# Patient Record
Sex: FEMALE | Race: WHITE | NOT HISPANIC OR LATINO | Employment: FULL TIME | URBAN - METROPOLITAN AREA
[De-identification: names, ages, dates, MRNs, and addresses within clinical notes are randomized per-mention and may not be internally consistent; named-entity substitution may affect disease eponyms.]

---

## 2023-04-13 ENCOUNTER — TELEMEDICINE (OUTPATIENT)
Dept: PHARMACY | Facility: HOSPITAL | Age: 53
End: 2023-04-13
Payer: COMMERCIAL

## 2023-04-13 DIAGNOSIS — E11.9 TYPE 2 DIABETES MELLITUS WITHOUT COMPLICATION, WITHOUT LONG-TERM CURRENT USE OF INSULIN (MULTI): Primary | ICD-10-CM

## 2023-04-13 RX ORDER — DULAGLUTIDE 0.75 MG/.5ML
0.75 INJECTION, SOLUTION SUBCUTANEOUS
COMMUNITY
Start: 2023-04-12 | End: 2023-05-11 | Stop reason: ALTCHOICE

## 2023-04-13 RX ORDER — FLUTICASONE PROPIONATE 50 MCG
1 SPRAY, SUSPENSION (ML) NASAL DAILY PRN
COMMUNITY
Start: 2022-03-31

## 2023-04-13 RX ORDER — MINERAL OIL
1 ENEMA (ML) RECTAL DAILY
COMMUNITY
Start: 2022-03-31 | End: 2024-03-13 | Stop reason: ALTCHOICE

## 2023-04-13 RX ORDER — ERGOCALCIFEROL 1.25 1/1
50000 CAPSULE ORAL
COMMUNITY
Start: 2023-04-12 | End: 2024-03-13 | Stop reason: ALTCHOICE

## 2023-04-13 RX ORDER — PRAVASTATIN SODIUM 20 MG/1
20 TABLET ORAL DAILY
COMMUNITY
Start: 2023-01-24 | End: 2023-11-20 | Stop reason: ALTCHOICE

## 2023-04-13 RX ORDER — EMPAGLIFLOZIN 10 MG/1
1 TABLET, FILM COATED ORAL DAILY
COMMUNITY
Start: 2020-07-30 | End: 2024-03-13 | Stop reason: SDUPTHER

## 2023-04-13 NOTE — PROGRESS NOTES
Ivelisse Pickens is a 52 y.o. female was referred to Clinical Pharmacy Team to complete a comprehensive medication review (CMR) with a pharmacist as part of the [employer] diabetes program.  The patient was referred for their Diabetes (VBID renewal).    Referring Provider: Chidi Blood DO    Subjective   No Known Allergies    [facility] - [place], [place] - [address]  [address] [address]  Phone: [phone] Fax: [phone]    Diabetes  She presents for her follow-up diabetic visit. She has type 2 diabetes mellitus. Her disease course has been stable. Current diabetic treatment includes oral agent (dual therapy) (trulicity and jardiance).     Objective     There were no vitals taken for this visit.     LAB  Lab Results   Component Value Date    BILITOT 0.5 01/21/2023    CALCIUM 9.3 01/21/2023    CO2 27 01/21/2023     01/21/2023    CREATININE 0.61 01/21/2023    GLUCOSE 111 (H) 01/21/2023    ALKPHOS 83 01/21/2023    K 4.2 01/21/2023    PROT 7.6 01/21/2023     01/21/2023    AST 12 01/21/2023    ALT 12 01/21/2023    BUN 11 01/21/2023    ANIONGAP 12 01/21/2023    MG 2.02 04/01/2022    ALBUMIN 4.6 01/21/2023    GFRF >90 01/21/2023     Lab Results   Component Value Date    TRIG 107 01/21/2023    CHOL 220 (H) 01/21/2023    HDL 45.9 01/21/2023     Lab Results   Component Value Date    HGBA1C 6.6 (A) 01/21/2023     Current Outpatient Medications on File Prior to Visit   Medication Sig Dispense Refill    fexofenadine (Allegra) 180 mg tablet Take 1 tablet (180 mg) by mouth once daily.      fluticasone (Flonase) 50 mcg/actuation nasal spray Administer 1 spray into each nostril once daily.      Jardiance 10 mg Take 1 tablet (10 mg) by mouth once daily.      pravastatin (Pravachol) 20 mg tablet Take 1 tablet (20 mg) by mouth once daily.      Trulicity 0.75 mg/0.5 mL pen injector Inject 0.75 mg under the skin 1 (one) time per week.      Vitamin D2 1,250 mcg  (50,000 unit) capsule Take 1 capsule (50,000 Units) by mouth 1 (one) time per week.       No current facility-administered medications on file prior to visit.      DRUG INTERACTIONS  - No significant drug interactions that require medication adjustment at this time.     SECONDARY PREVENTION  - Statin? yes  - ACE-I/ARB? no    PATIENT EDUCATION/GOALS    1. Discussed disease specific goals:   - Fasting B - 130 mg/dL  - Postprandial BG: less than 180 mg/dL  - A1c: less than 7%    Assessment/Plan   Problem List Items Addressed This Visit          Endocrine/Metabolic    Type 2 diabetes mellitus without complication, without long-term current use of insulin (CMS/[facility]) - Primary      Patient was referred to Clinical Pharmacy Team to complete a comprehensive medication review (CMR) with a pharmacist as part of the [employer] diabetes program. Because patient's A1c is at goal evidenced by most recent A1c of 6.6% on 23, will defer from making medication changes at this time. Please continue sending medications to  pharmacy.     2.   Medications are appropriately dosed for renal and hepatic function.     3.  Discussed pharmacy benefit plan:  - Explained that in order to continue receiving diabetic medications with added cost savings ($0 copays), patient must have an annual consultation with a clinical pharmacist to review medications, address any concerns, and manage diabetic medications when appropriate     Follow up: 11 months, 3/13/24    Josette Tello PharmD     Verbal consent to manage patient's drug therapy was obtained from [the patient and/or an individual authorized to act on behalf of a patient]. They were informed they may decline to participate or withdraw from participation in pharmacy services at any time.

## 2023-04-18 NOTE — PROGRESS NOTES
I reviewed the progress note and agree with the resident’s findings and plans as written. Case discussed with resident.    Emory Ortega, PharmD

## 2023-04-19 ENCOUNTER — HOSPITAL ENCOUNTER (OUTPATIENT)
Dept: DATA CONVERSION | Facility: HOSPITAL | Age: 53
End: 2023-04-19
Attending: RADIOLOGY
Payer: COMMERCIAL

## 2023-04-19 DIAGNOSIS — R92.8 OTHER ABNORMAL AND INCONCLUSIVE FINDINGS ON DIAGNOSTIC IMAGING OF BREAST: ICD-10-CM

## 2023-04-19 DIAGNOSIS — D05.12 INTRADUCTAL CARCINOMA IN SITU OF LEFT BREAST: ICD-10-CM

## 2023-04-19 DIAGNOSIS — R92.1 MAMMOGRAPHIC CALCIFICATION FOUND ON DIAGNOSTIC IMAGING OF BREAST: ICD-10-CM

## 2023-04-25 LAB
ALANINE AMINOTRANSFERASE (SGPT) (U/L) IN SER/PLAS: 13 U/L (ref 7–45)
ALBUMIN (G/DL) IN SER/PLAS: 4.7 G/DL (ref 3.4–5)
ALKALINE PHOSPHATASE (U/L) IN SER/PLAS: 75 U/L (ref 33–110)
ANION GAP IN SER/PLAS: 13 MMOL/L (ref 10–20)
ASPARTATE AMINOTRANSFERASE (SGOT) (U/L) IN SER/PLAS: 14 U/L (ref 9–39)
BILIRUBIN TOTAL (MG/DL) IN SER/PLAS: 0.5 MG/DL (ref 0–1.2)
CALCIUM (MG/DL) IN SER/PLAS: 9.4 MG/DL (ref 8.6–10.3)
CARBON DIOXIDE, TOTAL (MMOL/L) IN SER/PLAS: 27 MMOL/L (ref 21–32)
CHLORIDE (MMOL/L) IN SER/PLAS: 103 MMOL/L (ref 98–107)
CHOLESTEROL (MG/DL) IN SER/PLAS: 189 MG/DL (ref 0–199)
CHOLESTEROL IN HDL (MG/DL) IN SER/PLAS: 43.8 MG/DL
CHOLESTEROL/HDL RATIO: 4.3
COBALAMIN (VITAMIN B12) (PG/ML) IN SER/PLAS: 899 PG/ML (ref 211–911)
CREATININE (MG/DL) IN SER/PLAS: 0.65 MG/DL (ref 0.5–1.05)
ESTIMATED AVERAGE GLUCOSE FOR HBA1C: 126 MG/DL
GFR FEMALE: >90 ML/MIN/1.73M2
GLUCOSE (MG/DL) IN SER/PLAS: 105 MG/DL (ref 74–99)
HEMOGLOBIN A1C/HEMOGLOBIN TOTAL IN BLOOD: 6 %
LDL: 128 MG/DL (ref 0–99)
POTASSIUM (MMOL/L) IN SER/PLAS: 4.6 MMOL/L (ref 3.5–5.3)
PROTEIN TOTAL: 7.3 G/DL (ref 6.4–8.2)
SODIUM (MMOL/L) IN SER/PLAS: 138 MMOL/L (ref 136–145)
TRIGLYCERIDE (MG/DL) IN SER/PLAS: 87 MG/DL (ref 0–149)
UREA NITROGEN (MG/DL) IN SER/PLAS: 16 MG/DL (ref 6–23)
VLDL: 17 MG/DL (ref 0–40)

## 2023-04-27 LAB
COMPLETE PATHOLOGY REPORT: NORMAL
CONVERTED ADDENDUM DIAGNOSIS 2: NORMAL
CONVERTED CLINICAL DIAGNOSIS-HISTORY: NORMAL
CONVERTED FINAL DIAGNOSIS: NORMAL
CONVERTED FINAL REPORT PDF LINK TO COPY AND PASTE: NORMAL
CONVERTED GROSS DESCRIPTION: NORMAL

## 2023-05-01 ENCOUNTER — APPOINTMENT (OUTPATIENT)
Dept: PRIMARY CARE | Facility: CLINIC | Age: 53
End: 2023-05-01
Payer: COMMERCIAL

## 2023-05-11 ENCOUNTER — OFFICE VISIT (OUTPATIENT)
Dept: PRIMARY CARE | Facility: CLINIC | Age: 53
End: 2023-05-11
Payer: COMMERCIAL

## 2023-05-11 VITALS
DIASTOLIC BLOOD PRESSURE: 74 MMHG | HEIGHT: 69 IN | SYSTOLIC BLOOD PRESSURE: 108 MMHG | BODY MASS INDEX: 34.29 KG/M2 | TEMPERATURE: 97.7 F | OXYGEN SATURATION: 98 % | WEIGHT: 231.48 LBS | HEART RATE: 90 BPM

## 2023-05-11 DIAGNOSIS — E11.9 TYPE 2 DIABETES MELLITUS WITHOUT COMPLICATION, WITHOUT LONG-TERM CURRENT USE OF INSULIN (MULTI): Primary | ICD-10-CM

## 2023-05-11 PROBLEM — E55.9 VITAMIN D DEFICIENCY: Status: ACTIVE | Noted: 2023-05-11

## 2023-05-11 PROBLEM — K21.00 GASTRO-ESOPHAGEAL REFLUX DISEASE WITH ESOPHAGITIS: Status: ACTIVE | Noted: 2023-05-11

## 2023-05-11 PROBLEM — F41.9 ANXIETY: Status: ACTIVE | Noted: 2023-05-11

## 2023-05-11 PROBLEM — E78.5 BORDERLINE HYPERLIPIDEMIA: Status: ACTIVE | Noted: 2023-05-11

## 2023-05-11 PROBLEM — E53.8 VITAMIN B 12 DEFICIENCY: Status: ACTIVE | Noted: 2023-05-11

## 2023-05-11 PROBLEM — J30.9 ALLERGIC RHINITIS: Status: ACTIVE | Noted: 2023-05-11

## 2023-05-11 PROCEDURE — 3074F SYST BP LT 130 MM HG: CPT | Performed by: FAMILY MEDICINE

## 2023-05-11 PROCEDURE — 3078F DIAST BP <80 MM HG: CPT | Performed by: FAMILY MEDICINE

## 2023-05-11 PROCEDURE — 99214 OFFICE O/P EST MOD 30 MIN: CPT | Performed by: FAMILY MEDICINE

## 2023-05-11 PROCEDURE — 3044F HG A1C LEVEL LT 7.0%: CPT | Performed by: FAMILY MEDICINE

## 2023-05-11 RX ORDER — OMEPRAZOLE 20 MG/1
20 CAPSULE, DELAYED RELEASE ORAL
COMMUNITY

## 2023-05-11 RX ORDER — VIT C/E/ZN/COPPR/LUTEIN/ZEAXAN 250MG-90MG
25 CAPSULE ORAL DAILY
COMMUNITY

## 2023-05-11 RX ORDER — ASPIRIN 81 MG/1
81 TABLET ORAL DAILY
COMMUNITY

## 2023-05-11 RX ORDER — DULAGLUTIDE 1.5 MG/.5ML
1.5 INJECTION, SOLUTION SUBCUTANEOUS
Qty: 12 EACH | Refills: 3 | Status: SHIPPED | OUTPATIENT
Start: 2023-05-11 | End: 2023-05-18 | Stop reason: SDUPTHER

## 2023-05-11 ASSESSMENT — PATIENT HEALTH QUESTIONNAIRE - PHQ9
1. LITTLE INTEREST OR PLEASURE IN DOING THINGS: NOT AT ALL
SUM OF ALL RESPONSES TO PHQ9 QUESTIONS 1 AND 2: 0
2. FEELING DOWN, DEPRESSED OR HOPELESS: NOT AT ALL

## 2023-05-11 NOTE — PROGRESS NOTES
Subjective   Patient ID: Ivelisse Pickens is a 52 y.o. female who presents for Follow-up (Follow up. ).  HPI  Patient is following up on her diabetes.  Hemoglobin A1c was 6.0.  Glucose was 105 fasting.  We also reviewed her cholesterol which showed total cholesterol 189, HDL 43.8, , triglycerides 87.  Did not continue to use Trulicity and I have increased the dose which seems to have helped out.  She is also losing weight.  Denies any problems with any cut scrapes or sores that are healing, burning numbness tingling in the hands or feet, changes in vision, chest pain, shortness of breath, lightheadedness, dizziness or passing out.  Review of Systems    Objective   Physical Exam  General: Patient is alert and oriented ×3 and appears in no acute distress. No respiratory distress.    Head: Atraumatic normocephalic.    Eyes: EOMI, PERRLA      Ears: Canals patent without any irritation, tympanic membranes without inflammation, no swelling, normal light reflex.    Nose: Nares patent. Turbinates are not swollen. No discharge.    Mouth: Normal mucosa. Moist. No erythema, exudates, tonsillar enlargement.    Neck: Normal range of motion, no masses.  Thyroid is palpable and normal in size without any nodules. No anterior cervical or posterior cervical adenopathy.    Heart: Regular rate and rhythm, no murmurs clicks or gallops    Lungs: Clear to auscultation bilaterally without any rhonchi rales or wheezing, lung sounds heard throughout all lung fields    Abdomen: Soft, nontender, no rigidity, rebound, guarding or organomegaly. Bowel sounds ×4 quadrants.    Musculoskeletal: Normal range of motion, strength is grossly intact in the proximal distal muscles of the upper and lower extremities bilaterally, deep tendon reflexes +2 out of 4 and symmetric bilaterally at the patella, Achilles, biceps, triceps, sensation intact.    Nerves: Cranial nerves II through XII appear grossly intact and without deficit    Skin: Intact,  dry, no rashes or erythema    Psych: Normal affect.  Assessment/Plan   Problem List Items Addressed This Visit       Type 2 diabetes mellitus without complication, without long-term current use of insulin (CMS/[facility]) - Primary    Relevant Orders    CBC and Auto Differential    Comprehensive Metabolic Panel    Hemoglobin A1C    Lipid Panel    Vitamin B12   Increase Trulicity  On a statin and we do not use any blood pressure medications such as lisinopril or losartan due to lower blood pressure.  Instructed to follow-up with eye doctor yearly

## 2023-05-18 ENCOUNTER — TELEPHONE (OUTPATIENT)
Dept: PRIMARY CARE | Facility: CLINIC | Age: 53
End: 2023-05-18
Payer: COMMERCIAL

## 2023-05-18 DIAGNOSIS — E11.9 TYPE 2 DIABETES MELLITUS WITHOUT COMPLICATION, WITHOUT LONG-TERM CURRENT USE OF INSULIN (MULTI): ICD-10-CM

## 2023-05-18 RX ORDER — DULAGLUTIDE 1.5 MG/.5ML
1.5 INJECTION, SOLUTION SUBCUTANEOUS
Qty: 12 EACH | Refills: 3 | Status: SHIPPED | OUTPATIENT
Start: 2023-05-18 | End: 2023-05-18

## 2023-05-18 NOTE — TELEPHONE ENCOUNTER
Can we resend script for trulicity and start a new PA since ebony has tried metformin.     Thank you!

## 2023-05-30 ENCOUNTER — TELEPHONE (OUTPATIENT)
Dept: PRIMARY CARE | Facility: CLINIC | Age: 53
End: 2023-05-30
Payer: COMMERCIAL

## 2023-05-30 DIAGNOSIS — F40.240 CLAUSTROPHOBIA: Primary | ICD-10-CM

## 2023-05-30 RX ORDER — DIAZEPAM 5 MG/1
5 TABLET ORAL ONCE
Qty: 1 TABLET | Refills: 0 | Status: SHIPPED | OUTPATIENT
Start: 2023-05-30 | End: 2023-06-30 | Stop reason: SDUPTHER

## 2023-05-30 NOTE — TELEPHONE ENCOUNTER
Marlin left a message she had to reschedule her MRI because of claustrophobia, she is scheduled again this Friday for the MRI she is wondering if you could send a prescription in for her to help her with the MRI?

## 2023-06-29 ENCOUNTER — TELEPHONE (OUTPATIENT)
Dept: PRIMARY CARE | Facility: CLINIC | Age: 53
End: 2023-06-29
Payer: COMMERCIAL

## 2023-06-29 DIAGNOSIS — F40.240 CLAUSTROPHOBIA: ICD-10-CM

## 2023-06-29 NOTE — TELEPHONE ENCOUNTER
Pt left message that she has to have one more mri guided breast biopsy asking if you could send one more valium for this procedure.

## 2023-06-30 RX ORDER — DIAZEPAM 5 MG/1
5 TABLET ORAL ONCE
Qty: 2 TABLET | Refills: 0 | Status: SHIPPED | OUTPATIENT
Start: 2023-06-30 | End: 2024-03-13 | Stop reason: ALTCHOICE

## 2023-07-19 ENCOUNTER — HOSPITAL ENCOUNTER (OUTPATIENT)
Dept: DATA CONVERSION | Facility: HOSPITAL | Age: 53
End: 2023-07-19
Attending: SURGERY
Payer: COMMERCIAL

## 2023-07-19 DIAGNOSIS — R92.8 OTHER ABNORMAL AND INCONCLUSIVE FINDINGS ON DIAGNOSTIC IMAGING OF BREAST: ICD-10-CM

## 2023-07-25 LAB
COMPLETE PATHOLOGY REPORT: NORMAL
CONVERTED CLINICAL DIAGNOSIS-HISTORY: NORMAL
CONVERTED FINAL DIAGNOSIS: NORMAL
CONVERTED FINAL REPORT PDF LINK TO COPY AND PASTE: NORMAL
CONVERTED GROSS DESCRIPTION: NORMAL

## 2023-08-21 ENCOUNTER — HOSPITAL ENCOUNTER (OUTPATIENT)
Dept: DATA CONVERSION | Facility: HOSPITAL | Age: 53
End: 2023-08-21
Payer: COMMERCIAL

## 2023-08-21 DIAGNOSIS — R92.8 OTHER ABNORMAL AND INCONCLUSIVE FINDINGS ON DIAGNOSTIC IMAGING OF BREAST: ICD-10-CM

## 2023-08-21 DIAGNOSIS — D05.12 INTRADUCTAL CARCINOMA IN SITU OF LEFT BREAST: ICD-10-CM

## 2023-08-23 ENCOUNTER — HOSPITAL ENCOUNTER (OUTPATIENT)
Dept: DATA CONVERSION | Facility: HOSPITAL | Age: 53
End: 2023-08-23
Attending: SURGERY | Admitting: SURGERY
Payer: COMMERCIAL

## 2023-08-23 DIAGNOSIS — D05.12 INTRADUCTAL CARCINOMA IN SITU OF LEFT BREAST: ICD-10-CM

## 2023-08-23 DIAGNOSIS — Z17.0 ESTROGEN RECEPTOR POSITIVE STATUS (ER+): ICD-10-CM

## 2023-08-23 LAB
ATRIAL RATE: 79 BPM
P AXIS: 5 DEGREES
POCT GLUCOSE: 104 MG/DL (ref 74–99)
PR INTERVAL: 176 MS
Q ONSET: 249 MS
QRS COUNT: 13 BEATS
QRS DURATION: 76 MS
QT INTERVAL: 370 MS
QTC CALCULATION(BAZETT): 425 MS
QTC FREDERICIA: 405 MS
R AXIS: -10 DEGREES
T AXIS: 15 DEGREES
T OFFSET: 434 MS
VENTRICULAR RATE: 79 BPM

## 2023-08-28 LAB
COMPLETE PATHOLOGY REPORT: NORMAL
CONVERTED CLINICAL DIAGNOSIS-HISTORY: NORMAL
CONVERTED FINAL DIAGNOSIS: NORMAL
CONVERTED FINAL REPORT PDF LINK TO COPY AND PASTE: NORMAL
CONVERTED GROSS DESCRIPTION: NORMAL
CONVERTED INTRAOPERATIVE DIAGNOSIS: NORMAL
CONVERTED SYNOPTIC DIAGNOSIS: NORMAL

## 2023-09-29 VITALS — BODY MASS INDEX: 35.42 KG/M2 | WEIGHT: 233.69 LBS | HEIGHT: 68 IN

## 2023-09-30 NOTE — H&P
"    History & Physical Reviewed:   Pregnant/Lactating:  ·  Are You Pregnant no (1)   ·  Are You Currently Breastfeeding no (1)     I have reviewed the History and Physical dated:  10-Aug-2023   History and Physical reviewed and relevant findings noted. Patient examined to review pertinent physical  findings.: No significant changes   Home Medications Reviewed: no changes noted   Allergies Reviewed: no changes noted       ERAS (Enhanced Recovery After Surgery):  ·  ERAS Patient: no     Consent:   COVID-19 Consent:  ·  COVID-19 Risk Consent Surgeon has reviewed key risks related to the risk of luis enrique COVID-19 and if they contract COVID-19 what the risks are.       Electronic Signatures:  Jahaira Howard (MD)  (Signed 23-Aug-2023 08:41)   Authored: History & Physical Reviewed, ERAS, Consent,  Note Completion      Last Updated: 23-Aug-2023 08:41 by Jahaira Howard (MD)    References:  1.  Data Referenced From \"Patient Profile - Preop v3\" 23-Aug-2023 07:32   "

## 2023-10-01 NOTE — OP NOTE
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  LEFT BREAST MAGSEED GUIDED LUMPECTOMY WITH LEFT BREAST SENTINAL NODE BIOPSY (preop 730, nuclear 830, surgery 930)    Anesthesia: No anesthesiologist associated with this case  Estimated Blood Loss: 12  Findings: + calc, clip and magseed in specimen per Dr Renee.   Specimens(s) Collected: yes,  sentinal node frozen,  Left breast lumpectomy with mag seed         Operative Report:   Indication for procedure:  Pt with DCIS of left breast here for excision, and sentinal node biopsy.    Procedure:  Pt taken to OR placed supine. General anesthesia administered.   The patient had mag seed placed by radiology prior to procedure, as well as nuclear medicine administered circumareolar pre-operatively.   Pt was given general anesthesia,  Pt  was endotracheally intubated.   The patient was injected 3:1 diluted Methylene Blue Circumareolar.  The breast was massaged for 5 minutes. The Mag seed was localized and the area marked.  The Gamma probe was used to localize the sentinal node site, this  was also marked. The left breast was prepped and draped in sterile fashion.   The Incisions were localized with mix of lidocaine and Marcaine with epi.  The incision was made over the mag seed this area was excised using sharp dissection.  Hemostasis  was achieved with electrocautery.  The specimen was marked short superiorly and long laterally. Wound was irrigated.  Skin closed with 4-0 Vicryl in subcuticular fashion.  The X ray confirmed the calcifications the mag seed and the clip with in the specimen  confirmed by Dr Renee.   Attention was turned to the sentinal node site, incision was made Gamma probe was used to localize lymph node.  The node was also blue in color  and lymphatics were followed to localize the  node.  This node was removed with clips and or cautery.  sent to pathology.  Hemostasis was good at the end of the procedure.  Skin closed with 4-0 Vicryl.  Frozen section was negative for metastatic disea  in the sentinal node.  Note Recipients:   Chidi Blood DO Wojtasik, Lynn A, MD - [phone] [Preferred]      Latanya Synoptic Op Report:  Breast [place] Node Biopsy  Operation performed with curative intent: yes  Tracer(s) used to identify sentinel nodes in the upfront surgery (non-neoadjuvant) setting: dye and radioactive tracer  Tracer(s) used to identify sentinel nodes in the neoadjuvant setting: not applicable  All nodes (colored or non-colored) present at the end of a dye-filled lymphatic channel were removed: yes  All significantly radioactive nodes were removed: yes  All palpably suspicious nodes were removed: yes  Biopsy-proven positive nodes marked with clips prior to chemotherapy were identified and removed: not applicable                    Attestation:   Note Completion:  Attending Attestation I performed the procedure without a resident         Electronic Signatures:  Jahaira Howard)  (Signed 25-Aug-2023 15:09)   Authored: Post-Operative Note, Chart Review, Note Completion      Last Updated: 25-Aug-2023 15:09 by Jahaira Howard)

## 2023-10-04 ENCOUNTER — PHARMACY VISIT (OUTPATIENT)
Dept: PHARMACY | Facility: CLINIC | Age: 53
End: 2023-10-04
Payer: COMMERCIAL

## 2023-10-04 PROCEDURE — RXMED WILLOW AMBULATORY MEDICATION CHARGE

## 2023-10-10 ENCOUNTER — HOSPITAL ENCOUNTER (OUTPATIENT)
Dept: RADIATION ONCOLOGY | Facility: CLINIC | Age: 53
Setting detail: RADIATION/ONCOLOGY SERIES
Discharge: STILL ADMITTED OR ADMINISTRATIVE | End: 2023-10-10
Payer: COMMERCIAL

## 2023-10-10 PROCEDURE — 77334 RADIATION TREATMENT AID(S): CPT | Performed by: STUDENT IN AN ORGANIZED HEALTH CARE EDUCATION/TRAINING PROGRAM

## 2023-10-10 PROCEDURE — 77295 3-D RADIOTHERAPY PLAN: CPT | Performed by: STUDENT IN AN ORGANIZED HEALTH CARE EDUCATION/TRAINING PROGRAM

## 2023-10-10 PROCEDURE — 77300 RADIATION THERAPY DOSE PLAN: CPT | Performed by: STUDENT IN AN ORGANIZED HEALTH CARE EDUCATION/TRAINING PROGRAM

## 2023-10-10 PROCEDURE — 77336 RADIATION PHYSICS CONSULT: CPT | Performed by: STUDENT IN AN ORGANIZED HEALTH CARE EDUCATION/TRAINING PROGRAM

## 2023-10-12 ENCOUNTER — TELEPHONE (OUTPATIENT)
Dept: RADIATION ONCOLOGY | Facility: CLINIC | Age: 53
End: 2023-10-12
Payer: COMMERCIAL

## 2023-10-13 PROBLEM — R73.01 IMPAIRED FASTING GLUCOSE: Status: ACTIVE | Noted: 2023-10-13

## 2023-10-13 PROBLEM — E78.2 MIXED HYPERLIPIDEMIA: Status: ACTIVE | Noted: 2023-10-13

## 2023-10-13 PROBLEM — R92.8 ABNORMAL MAMMOGRAM OF LEFT BREAST: Status: ACTIVE | Noted: 2023-10-13

## 2023-10-13 PROBLEM — L02.01 FACIAL ABSCESS: Status: ACTIVE | Noted: 2023-10-13

## 2023-10-13 PROBLEM — R73.9 ELEVATED BLOOD SUGAR: Status: ACTIVE | Noted: 2023-10-13

## 2023-10-16 ENCOUNTER — APPOINTMENT (OUTPATIENT)
Dept: RADIATION ONCOLOGY | Facility: CLINIC | Age: 53
End: 2023-10-16
Payer: COMMERCIAL

## 2023-10-17 ENCOUNTER — HOSPITAL ENCOUNTER (OUTPATIENT)
Dept: RADIATION ONCOLOGY | Facility: CLINIC | Age: 53
Setting detail: RADIATION/ONCOLOGY SERIES
Discharge: STILL ADMITTED OR ADMINISTRATIVE | End: 2023-10-17
Payer: COMMERCIAL

## 2023-10-17 PROCEDURE — 77280 THER RAD SIMULAJ FIELD SMPL: CPT | Performed by: RADIOLOGY

## 2023-10-17 PROCEDURE — 77412 RADIATION TX DELIVERY LVL 3: CPT | Performed by: RADIOLOGY

## 2023-10-18 ENCOUNTER — HOSPITAL ENCOUNTER (OUTPATIENT)
Dept: RADIATION ONCOLOGY | Facility: CLINIC | Age: 53
Setting detail: RADIATION/ONCOLOGY SERIES
Discharge: STILL ADMITTED OR ADMINISTRATIVE | End: 2023-10-18
Payer: COMMERCIAL

## 2023-10-18 DIAGNOSIS — D05.12 INTRADUCTAL CARCINOMA IN SITU OF LEFT BREAST: ICD-10-CM

## 2023-10-18 DIAGNOSIS — Z51.0 ENCOUNTER FOR ANTINEOPLASTIC RADIATION THERAPY: ICD-10-CM

## 2023-10-18 LAB
RAD ONC MSQ ACTUAL FRACTIONS DELIVERED: 2
RAD ONC MSQ ACTUAL SESSION DELIVERED DOSE: 266 CGRAY
RAD ONC MSQ ACTUAL TOTAL DOSE: 532 CGRAY
RAD ONC MSQ ELAPSED DAYS: 1
RAD ONC MSQ LAST DATE: NORMAL
RAD ONC MSQ PRESCRIBED FRACTIONAL DOSE: 266 CGRAY
RAD ONC MSQ PRESCRIBED NUMBER OF FRACTIONS: 16
RAD ONC MSQ PRESCRIBED TECHNIQUE: NORMAL
RAD ONC MSQ PRESCRIBED TOTAL DOSE: 4256 CGRAY
RAD ONC MSQ PRESCRIPTION PATTERN COMMENT: NORMAL
RAD ONC MSQ START DATE: NORMAL
RAD ONC MSQ TREATMENT COURSE NUMBER: 1
RAD ONC MSQ TREATMENT SITE: NORMAL

## 2023-10-18 PROCEDURE — 77387 GUIDANCE FOR RADJ TX DLVR: CPT | Performed by: STUDENT IN AN ORGANIZED HEALTH CARE EDUCATION/TRAINING PROGRAM

## 2023-10-18 PROCEDURE — 77412 RADIATION TX DELIVERY LVL 3: CPT | Performed by: STUDENT IN AN ORGANIZED HEALTH CARE EDUCATION/TRAINING PROGRAM

## 2023-10-18 PROCEDURE — 77014 CHG CT GUIDANCE RADIATION THERAPY FLDS PLACEMENT: CPT | Performed by: STUDENT IN AN ORGANIZED HEALTH CARE EDUCATION/TRAINING PROGRAM

## 2023-10-19 ENCOUNTER — HOSPITAL ENCOUNTER (OUTPATIENT)
Dept: RADIATION ONCOLOGY | Facility: CLINIC | Age: 53
Setting detail: RADIATION/ONCOLOGY SERIES
Discharge: STILL ADMITTED OR ADMINISTRATIVE | End: 2023-10-19
Payer: COMMERCIAL

## 2023-10-19 VITALS
TEMPERATURE: 97.9 F | DIASTOLIC BLOOD PRESSURE: 96 MMHG | SYSTOLIC BLOOD PRESSURE: 135 MMHG | BODY MASS INDEX: 34.91 KG/M2 | RESPIRATION RATE: 16 BRPM | WEIGHT: 229 LBS | OXYGEN SATURATION: 95 % | HEART RATE: 90 BPM

## 2023-10-19 DIAGNOSIS — D05.12 INTRADUCTAL CARCINOMA IN SITU OF LEFT BREAST: ICD-10-CM

## 2023-10-19 DIAGNOSIS — Z51.0 ENCOUNTER FOR ANTINEOPLASTIC RADIATION THERAPY: ICD-10-CM

## 2023-10-19 LAB
RAD ONC MSQ ACTUAL FRACTIONS DELIVERED: 3
RAD ONC MSQ ACTUAL SESSION DELIVERED DOSE: 266 CGRAY
RAD ONC MSQ ACTUAL TOTAL DOSE: 798 CGRAY
RAD ONC MSQ ELAPSED DAYS: 2
RAD ONC MSQ LAST DATE: NORMAL
RAD ONC MSQ PRESCRIBED FRACTIONAL DOSE: 266 CGRAY
RAD ONC MSQ PRESCRIBED NUMBER OF FRACTIONS: 16
RAD ONC MSQ PRESCRIBED TECHNIQUE: NORMAL
RAD ONC MSQ PRESCRIBED TOTAL DOSE: 4256 CGRAY
RAD ONC MSQ PRESCRIPTION PATTERN COMMENT: NORMAL
RAD ONC MSQ START DATE: NORMAL
RAD ONC MSQ TREATMENT COURSE NUMBER: 1
RAD ONC MSQ TREATMENT SITE: NORMAL

## 2023-10-19 PROCEDURE — 77014 CHG CT GUIDANCE RADIATION THERAPY FLDS PLACEMENT: CPT | Performed by: STUDENT IN AN ORGANIZED HEALTH CARE EDUCATION/TRAINING PROGRAM

## 2023-10-19 PROCEDURE — 77427 RADIATION TX MANAGEMENT X5: CPT | Performed by: STUDENT IN AN ORGANIZED HEALTH CARE EDUCATION/TRAINING PROGRAM

## 2023-10-19 PROCEDURE — 77412 RADIATION TX DELIVERY LVL 3: CPT | Performed by: STUDENT IN AN ORGANIZED HEALTH CARE EDUCATION/TRAINING PROGRAM

## 2023-10-19 PROCEDURE — 77387 GUIDANCE FOR RADJ TX DLVR: CPT | Performed by: STUDENT IN AN ORGANIZED HEALTH CARE EDUCATION/TRAINING PROGRAM

## 2023-10-19 ASSESSMENT — PAIN SCALES - GENERAL: PAINLEVEL: 0-NO PAIN

## 2023-10-19 NOTE — PROGRESS NOTES
Radiation Oncology On Treatment Visit    Patient Name:  Ivelisse Pickens  MRN:  [id number]  :  1970    Referring Provider: No ref. provider found  Primary Care Provider: Chidi Blood DO  Care Team: Patient Care Team:  Chidi Blood DO as PCP - General  Chidi Blood DO as PCP - Employee [employer] PCP    Date of Service: 10/19/2023     Diagnosis:   Specialty Problems    None    Treatment Summary:  Radiation Treatments       Active   Lt Breast (Started on 10/17/2023)   Most recent fraction: 266 cGy given on 10/19/2023   Total given: 798 cGy / 4,256 cGy  (3 of 16 fractions)   Elapsed Days: 2   Technique: 3D                   SUBJECTIVE: Patient started left breast radiation on Tuesday, and she has not noticed any significant side effects. Denies fatigue, skin irritation, or breast swelling.      OBJECTIVE:   Vital Signs:  BP (!) 135/96 (BP Location: Left arm, Patient Position: Sitting, BP Cuff Size: Adult)   Pulse 90   Temp 36.6 °C (97.9 °F) (Skin)   Resp 16   Wt 104 kg (229 lb)   SpO2 95%   BMI 34.91 kg/m²    Pain Scale: The patient's current pain level was assessed.  They report currently having a pain of 0 out of 10.    Other Pertinent Findings:     Toxicity Assessment          10/19/2023    15:43   Toxicity Assessment   Treatment Site Breast   Anorexia Grade 0   Anxiety Grade 0   Dehydration Grade 0   Depression Grade 0   Dermatitis Radiation Grade 0       aloe vesta given   Diarrhea Grade 0   Fatigue Grade 0   Fibrosis Deep Connective Tissue Grade 0   Fracture Grade 0   Nausea Grade 0   Pain Grade 0   Treatment Related Secondary Malignancy Grade 0   Tumor Pain Grade 0   Vomiting Grade 0   Abdominal Pain Grade 0   Dysphagia Grade 0   Esophagitis Grade 0   Gastric Hemorrhage Grade 0   Mucositis Oral Grade 0   Dry Mouth Grade 0   Breast Infection Grade 0   Seroma Grade 0   Joint Range of Motion Decreased Grade 0   Joint Range of Motion Decreased Lumbar Spine Grade 0   Brachial Plexopathy Grade 0   Breast  Atrophy Grade 0   Breast Pain Grade 0   Nipple Deformity Grade 0   Pneumonitis Grade 0   Edema Limbs Grade 0   Lymphedema Grade 0   Thromboembolic Event Grade 0   Hot Flashes Grade 0        Assessment / Plan:  The patient is tolerating radiation therapy as anticipated.  Continue per current treatment plan. I reviewed common acute side effects of breast radiation and provided skin moisturizer.

## 2023-10-20 ENCOUNTER — HOSPITAL ENCOUNTER (OUTPATIENT)
Dept: RADIATION ONCOLOGY | Facility: CLINIC | Age: 53
Setting detail: RADIATION/ONCOLOGY SERIES
Discharge: STILL ADMITTED OR ADMINISTRATIVE | End: 2023-10-20
Payer: COMMERCIAL

## 2023-10-20 DIAGNOSIS — D05.12 INTRADUCTAL CARCINOMA IN SITU OF LEFT BREAST: ICD-10-CM

## 2023-10-20 DIAGNOSIS — Z51.0 ENCOUNTER FOR ANTINEOPLASTIC RADIATION THERAPY: ICD-10-CM

## 2023-10-20 LAB
RAD ONC MSQ ACTUAL FRACTIONS DELIVERED: 4
RAD ONC MSQ ACTUAL SESSION DELIVERED DOSE: 266 CGRAY
RAD ONC MSQ ACTUAL TOTAL DOSE: 1064 CGRAY
RAD ONC MSQ ELAPSED DAYS: 3
RAD ONC MSQ LAST DATE: NORMAL
RAD ONC MSQ PRESCRIBED FRACTIONAL DOSE: 266 CGRAY
RAD ONC MSQ PRESCRIBED NUMBER OF FRACTIONS: 16
RAD ONC MSQ PRESCRIBED TECHNIQUE: NORMAL
RAD ONC MSQ PRESCRIBED TOTAL DOSE: 4256 CGRAY
RAD ONC MSQ PRESCRIPTION PATTERN COMMENT: NORMAL
RAD ONC MSQ START DATE: NORMAL
RAD ONC MSQ TREATMENT COURSE NUMBER: 1
RAD ONC MSQ TREATMENT SITE: NORMAL

## 2023-10-20 PROCEDURE — 77014 CHG CT GUIDANCE RADIATION THERAPY FLDS PLACEMENT: CPT | Performed by: STUDENT IN AN ORGANIZED HEALTH CARE EDUCATION/TRAINING PROGRAM

## 2023-10-20 PROCEDURE — 77412 RADIATION TX DELIVERY LVL 3: CPT | Performed by: STUDENT IN AN ORGANIZED HEALTH CARE EDUCATION/TRAINING PROGRAM

## 2023-10-20 PROCEDURE — 77387 GUIDANCE FOR RADJ TX DLVR: CPT | Performed by: STUDENT IN AN ORGANIZED HEALTH CARE EDUCATION/TRAINING PROGRAM

## 2023-10-23 ENCOUNTER — HOSPITAL ENCOUNTER (OUTPATIENT)
Dept: RADIATION ONCOLOGY | Facility: CLINIC | Age: 53
Setting detail: RADIATION/ONCOLOGY SERIES
Discharge: STILL ADMITTED OR ADMINISTRATIVE | End: 2023-10-23
Payer: COMMERCIAL

## 2023-10-23 DIAGNOSIS — Z51.0 ENCOUNTER FOR ANTINEOPLASTIC RADIATION THERAPY: ICD-10-CM

## 2023-10-23 DIAGNOSIS — D05.12 INTRADUCTAL CARCINOMA IN SITU OF LEFT BREAST: ICD-10-CM

## 2023-10-23 LAB
RAD ONC MSQ ACTUAL FRACTIONS DELIVERED: 5
RAD ONC MSQ ACTUAL SESSION DELIVERED DOSE: 266 CGRAY
RAD ONC MSQ ACTUAL TOTAL DOSE: 1330 CGRAY
RAD ONC MSQ ELAPSED DAYS: 6
RAD ONC MSQ LAST DATE: NORMAL
RAD ONC MSQ PRESCRIBED FRACTIONAL DOSE: 266 CGRAY
RAD ONC MSQ PRESCRIBED NUMBER OF FRACTIONS: 16
RAD ONC MSQ PRESCRIBED TECHNIQUE: NORMAL
RAD ONC MSQ PRESCRIBED TOTAL DOSE: 4256 CGRAY
RAD ONC MSQ PRESCRIPTION PATTERN COMMENT: NORMAL
RAD ONC MSQ START DATE: NORMAL
RAD ONC MSQ TREATMENT COURSE NUMBER: 1
RAD ONC MSQ TREATMENT SITE: NORMAL

## 2023-10-23 PROCEDURE — 77412 RADIATION TX DELIVERY LVL 3: CPT | Performed by: STUDENT IN AN ORGANIZED HEALTH CARE EDUCATION/TRAINING PROGRAM

## 2023-10-23 PROCEDURE — 77387 GUIDANCE FOR RADJ TX DLVR: CPT | Performed by: STUDENT IN AN ORGANIZED HEALTH CARE EDUCATION/TRAINING PROGRAM

## 2023-10-23 PROCEDURE — 77334 RADIATION TREATMENT AID(S): CPT | Performed by: STUDENT IN AN ORGANIZED HEALTH CARE EDUCATION/TRAINING PROGRAM

## 2023-10-23 PROCEDURE — 77300 RADIATION THERAPY DOSE PLAN: CPT | Performed by: STUDENT IN AN ORGANIZED HEALTH CARE EDUCATION/TRAINING PROGRAM

## 2023-10-23 PROCEDURE — 77014 CHG CT GUIDANCE RADIATION THERAPY FLDS PLACEMENT: CPT | Performed by: STUDENT IN AN ORGANIZED HEALTH CARE EDUCATION/TRAINING PROGRAM

## 2023-10-24 ENCOUNTER — HOSPITAL ENCOUNTER (OUTPATIENT)
Dept: RADIATION ONCOLOGY | Facility: CLINIC | Age: 53
Setting detail: RADIATION/ONCOLOGY SERIES
Discharge: STILL ADMITTED OR ADMINISTRATIVE | End: 2023-10-24
Payer: COMMERCIAL

## 2023-10-24 DIAGNOSIS — Z51.0 ENCOUNTER FOR ANTINEOPLASTIC RADIATION THERAPY: ICD-10-CM

## 2023-10-24 DIAGNOSIS — D05.12 INTRADUCTAL CARCINOMA IN SITU OF LEFT BREAST: ICD-10-CM

## 2023-10-24 LAB
RAD ONC MSQ ACTUAL FRACTIONS DELIVERED: 6
RAD ONC MSQ ACTUAL SESSION DELIVERED DOSE: 266 CGRAY
RAD ONC MSQ ACTUAL TOTAL DOSE: 1596 CGRAY
RAD ONC MSQ ELAPSED DAYS: 7
RAD ONC MSQ LAST DATE: NORMAL
RAD ONC MSQ PRESCRIBED FRACTIONAL DOSE: 266 CGRAY
RAD ONC MSQ PRESCRIBED NUMBER OF FRACTIONS: 16
RAD ONC MSQ PRESCRIBED TECHNIQUE: NORMAL
RAD ONC MSQ PRESCRIBED TOTAL DOSE: 4256 CGRAY
RAD ONC MSQ PRESCRIPTION PATTERN COMMENT: NORMAL
RAD ONC MSQ START DATE: NORMAL
RAD ONC MSQ TREATMENT COURSE NUMBER: 1
RAD ONC MSQ TREATMENT SITE: NORMAL

## 2023-10-24 PROCEDURE — 77412 RADIATION TX DELIVERY LVL 3: CPT | Performed by: STUDENT IN AN ORGANIZED HEALTH CARE EDUCATION/TRAINING PROGRAM

## 2023-10-24 PROCEDURE — 77387 GUIDANCE FOR RADJ TX DLVR: CPT | Performed by: STUDENT IN AN ORGANIZED HEALTH CARE EDUCATION/TRAINING PROGRAM

## 2023-10-24 PROCEDURE — 77014 CHG CT GUIDANCE RADIATION THERAPY FLDS PLACEMENT: CPT | Performed by: STUDENT IN AN ORGANIZED HEALTH CARE EDUCATION/TRAINING PROGRAM

## 2023-10-25 ENCOUNTER — LAB (OUTPATIENT)
Dept: LAB | Facility: HOSPITAL | Age: 53
End: 2023-10-25
Payer: COMMERCIAL

## 2023-10-25 ENCOUNTER — HOSPITAL ENCOUNTER (OUTPATIENT)
Dept: RADIATION ONCOLOGY | Facility: CLINIC | Age: 53
Setting detail: RADIATION/ONCOLOGY SERIES
Discharge: STILL ADMITTED OR ADMINISTRATIVE | End: 2023-10-25
Payer: COMMERCIAL

## 2023-10-25 ENCOUNTER — OFFICE VISIT (OUTPATIENT)
Dept: HEMATOLOGY/ONCOLOGY | Facility: CLINIC | Age: 53
End: 2023-10-25
Payer: COMMERCIAL

## 2023-10-25 VITALS
BODY MASS INDEX: 35.47 KG/M2 | HEART RATE: 85 BPM | SYSTOLIC BLOOD PRESSURE: 133 MMHG | WEIGHT: 225.97 LBS | HEIGHT: 67 IN | TEMPERATURE: 97 F | RESPIRATION RATE: 16 BRPM | DIASTOLIC BLOOD PRESSURE: 90 MMHG

## 2023-10-25 DIAGNOSIS — D05.12 INTRADUCTAL CARCINOMA IN SITU OF LEFT BREAST: ICD-10-CM

## 2023-10-25 DIAGNOSIS — D05.12 DUCTAL CARCINOMA IN SITU (DCIS) OF LEFT BREAST: ICD-10-CM

## 2023-10-25 DIAGNOSIS — Z51.0 ENCOUNTER FOR ANTINEOPLASTIC RADIATION THERAPY: ICD-10-CM

## 2023-10-25 LAB
RAD ONC MSQ ACTUAL FRACTIONS DELIVERED: 7
RAD ONC MSQ ACTUAL SESSION DELIVERED DOSE: 266 CGRAY
RAD ONC MSQ ACTUAL TOTAL DOSE: 1862 CGRAY
RAD ONC MSQ ELAPSED DAYS: 8
RAD ONC MSQ LAST DATE: NORMAL
RAD ONC MSQ PRESCRIBED FRACTIONAL DOSE: 266 CGRAY
RAD ONC MSQ PRESCRIBED NUMBER OF FRACTIONS: 16
RAD ONC MSQ PRESCRIBED TECHNIQUE: NORMAL
RAD ONC MSQ PRESCRIBED TOTAL DOSE: 4256 CGRAY
RAD ONC MSQ PRESCRIPTION PATTERN COMMENT: NORMAL
RAD ONC MSQ START DATE: NORMAL
RAD ONC MSQ TREATMENT COURSE NUMBER: 1
RAD ONC MSQ TREATMENT SITE: NORMAL

## 2023-10-25 PROCEDURE — 99215 OFFICE O/P EST HI 40 MIN: CPT | Performed by: INTERNAL MEDICINE

## 2023-10-25 PROCEDURE — 83001 ASSAY OF GONADOTROPIN (FSH): CPT

## 2023-10-25 PROCEDURE — 3075F SYST BP GE 130 - 139MM HG: CPT | Performed by: INTERNAL MEDICINE

## 2023-10-25 PROCEDURE — 3044F HG A1C LEVEL LT 7.0%: CPT | Performed by: INTERNAL MEDICINE

## 2023-10-25 PROCEDURE — 1036F TOBACCO NON-USER: CPT | Performed by: INTERNAL MEDICINE

## 2023-10-25 PROCEDURE — 82672 ASSAY OF ESTROGEN: CPT

## 2023-10-25 PROCEDURE — 77387 GUIDANCE FOR RADJ TX DLVR: CPT | Performed by: STUDENT IN AN ORGANIZED HEALTH CARE EDUCATION/TRAINING PROGRAM

## 2023-10-25 PROCEDURE — 99205 OFFICE O/P NEW HI 60 MIN: CPT | Performed by: INTERNAL MEDICINE

## 2023-10-25 PROCEDURE — 3080F DIAST BP >= 90 MM HG: CPT | Performed by: INTERNAL MEDICINE

## 2023-10-25 PROCEDURE — 77014 CHG CT GUIDANCE RADIATION THERAPY FLDS PLACEMENT: CPT | Performed by: STUDENT IN AN ORGANIZED HEALTH CARE EDUCATION/TRAINING PROGRAM

## 2023-10-25 PROCEDURE — 77336 RADIATION PHYSICS CONSULT: CPT | Performed by: STUDENT IN AN ORGANIZED HEALTH CARE EDUCATION/TRAINING PROGRAM

## 2023-10-25 PROCEDURE — 36415 COLL VENOUS BLD VENIPUNCTURE: CPT

## 2023-10-25 PROCEDURE — 77412 RADIATION TX DELIVERY LVL 3: CPT | Performed by: STUDENT IN AN ORGANIZED HEALTH CARE EDUCATION/TRAINING PROGRAM

## 2023-10-25 ASSESSMENT — COLUMBIA-SUICIDE SEVERITY RATING SCALE - C-SSRS
2. HAVE YOU ACTUALLY HAD ANY THOUGHTS OF KILLING YOURSELF?: NO
6. HAVE YOU EVER DONE ANYTHING, STARTED TO DO ANYTHING, OR PREPARED TO DO ANYTHING TO END YOUR LIFE?: NO
1. IN THE PAST MONTH, HAVE YOU WISHED YOU WERE DEAD OR WISHED YOU COULD GO TO SLEEP AND NOT WAKE UP?: NO

## 2023-10-25 ASSESSMENT — PATIENT HEALTH QUESTIONNAIRE - PHQ9
1. LITTLE INTEREST OR PLEASURE IN DOING THINGS: NOT AT ALL
2. FEELING DOWN, DEPRESSED OR HOPELESS: NOT AT ALL
SUM OF ALL RESPONSES TO PHQ9 QUESTIONS 1 AND 2: 0

## 2023-10-25 ASSESSMENT — PAIN SCALES - GENERAL: PAINLEVEL: 0-NO PAIN

## 2023-10-25 NOTE — PROGRESS NOTES
52 year old woman with left breast  DCIS (1.2 cm, intermediate grade, ER+) s/p left lumpectomy/SLNB on 8/23/23 with widely negative margins     Interval History:    Referred by Dr. Howard      Screening mammogram on 9/23/22 showed developing calcifications and architectural alteration in the left breast UOQ (BI-RADS 0). Diagnostic  mammogram and ultrasound on 10/18/22 was read as calcifications with benign morphology (BI-RADS 3). Repeat diagnostic mammogram on 4/11/23 showed subtle increase in the number of calcifications that was suspicious (BI-RADS 4).     Biopsy of the calcifications on 4/19/23 showed intermediate grade DCIS, solid and cribriform pattern with calcifications, ER>95%.     Breast MRI on 6/2/23 showed non-mass enhancement in the right breast and minimal left breast biopsy changes with no additional sites of disease seen. There was no axillary or internal mammary adenopathy. MRI guided biopsy of the right breast on 7/19/23  showed perilobular hemangioma.     On 8/23/23, Dr. Howard took her to the OR for left lumpectomy/SLNB. Pathology showed DCIS measuring up to 1.2 cm, grade 2, with negative margins (>2 mm). One sentinel node was negative.   Medical oncology consultation was requested because of preventive therapy.  Patient is already deceiving go radiotherapy which will finish on normal testing 2023.       ROS  No headache and dizziness, no hot flash, no fever, no chest pain and shortness of breath, no nausea vomiting, no abdominal pain, no diarrhea and constipation.  Status post hysterectomy, patient still have ovaries.    Patient is active working full-time    Past Medical History:   Diagnosis Date    Personal history of other medical treatment     History of screening mammography      Past Surgical History:   Procedure Laterality Date    BI MAMMO GUIDED LOCALIZATION BREAST LEFT Left 8/21/2023    BI MAMMO GUIDED LOCALIZATION BREAST LEFT 8/21/2023 POR MEENAKSHI    OTHER SURGICAL HISTORY  07/11/2019     Cholecystectomy    OTHER SURGICAL HISTORY  07/11/2019    Hysterectomy    OTHER SURGICAL HISTORY  07/11/2019    Tonsillectomy        No family history on file.   Social History     Tobacco Use   Smoking Status Never   Smokeless Tobacco Never      Current Outpatient Medications:     dulaglutide (Trulicity) 0.75 mg/0.5 mL pen injector, INJECT 1/2 ML UNDER THE SKIN EVERY WEEK, Disp: 2 mL, Rfl: 6    dulaglutide (Trulicity) 1.5 mg/0.5 mL pen injector injection, INJECT 1.5MG UNDER THE SKIN ONCE A WEEK, Disp: 6 mL, Rfl: 3    empagliflozin (Jardiance) 10 mg, TAKE 1 TABLET BY MOUTH ONCE DAILY, Disp: 90 tablet, Rfl: 3    omeprazole (PriLOSEC) 20 mg DR capsule, once daily., Disp: , Rfl:     pravastatin (Pravachol) 20 mg tablet, Take 1 tablet (20 mg) by mouth once daily., Disp: , Rfl:     aspirin 81 mg EC tablet, once daily., Disp: , Rfl:     cholecalciferol (Vitamin D-3) 25 MCG (1000 UT) capsule, once daily., Disp: , Rfl:     dextromethorphan-guaifenesin (Mucinex DM)  mg 12 hr tablet, Take 1 tablet by mouth every 12 hours., Disp: , Rfl:     diazePAM (Valium) 5 mg tablet, Take 1 tablet (5 mg) by mouth 1 time for 1 dose., Disp: 2 tablet, Rfl: 0    fexofenadine (Allegra) 180 mg tablet, Take 1 tablet (180 mg) by mouth once daily., Disp: , Rfl:     fluticasone (Flonase) 50 mcg/actuation nasal spray, Administer 1 spray into each nostril once daily., Disp: , Rfl:     Jardiance 10 mg, Take 1 tablet (10 mg) by mouth once daily., Disp: , Rfl:     oxyCODONE-acetaminophen (Percocet) 5-325 mg tablet, TAKE 1 TABLET BY MOUTH EVERY 6 HOURS AS NEEDED FOR PAIN (Patient not taking: Reported on 10/19/2023), Disp: 20 tablet, Rfl: 0    traMADol (Ultram) 50 mg tablet, TAKE 1 TABLET BY MOUTH EVERY 6 HOURS AS NEEDED FOR PAIN (Patient not taking: Reported on 10/19/2023), Disp: 20 tablet, Rfl: 0    Vitamin D2 1,250 mcg (50,000 unit) capsule, Take 1 capsule (50,000 Units) by mouth 1 (one) time per week., Disp: , Rfl:       Physical Exam  Last  "Recorded Vitals  Blood pressure 133/90, pulse 85, temperature 36.1 °C (97 °F), temperature source Temporal, resp. rate 16, height (S) 1.698 m (5' 6.85\"), weight 102 kg (225 lb 15.5 oz).     Relevant Results            Lab Results   Component Value Date    WBC 8.2 08/11/2023    HGB 12.7 08/11/2023    HCT 38.6 08/11/2023     08/11/2023    CHOL 189 04/25/2023    TRIG 87 04/25/2023    HDL 43.8 04/25/2023    ALT 13 04/25/2023    AST 14 04/25/2023     08/11/2023    K 4.1 08/11/2023     08/11/2023    CREATININE 0.88 08/11/2023    BUN 11 08/11/2023    CO2 25 08/11/2023    HGBA1C 6.0 (A) 04/25/2023       BI RAD breast exam specimen  Narrative: Interpreted By:  JAZMYN CORONA MD, MARIA  MRN: [id number]  Patient Name: BETY BIGGS     STUDY:  RAD BREAST EXAM SPECIMEN;  8/23/2023 10:41 am     ACCESSION NUMBER(S):  [id number]     ORDERING CLINICIAN:  ONIEL MACK     INDICATION:  Left Breast Magseed localization.     FINDINGS:  The specimen radiograph demonstrates the calcifications of concern,  associated biopsy marker and the Magseed in the tissues.     Impression: Status post surgical excision of Left breast Magseed localization.     MACRO:  None  NM sentinel node INJ for BX  no scan performed  Narrative: Interpreted By:  SOFI VAZQUEZ MD and KEARA THORPE MD  MRN: [id number]  Patient Name: BETY BIGGS     STUDY:  INJECTION ONLY FOR SENTINEL NODE BIOPSY, NO SCAN PERFORMED;  8/23/2023 9:16 am     INDICATION:  sent node.     COMPARISON:  None.     ACCESSION NUMBER(S):  [id number]     ORDERING CLINICIAN:  ONIEL MACK     TECHNIQUE:  DIVISION OF NUCLEAR MEDICINE  BREAST SENTINEL NODE LOCALIZATION     Syringes containing a total of  569 microcuries of Tc-99m tilmanocept  (Lymphoseek) were provided for injection and sentinel lymph node  localization to be performed by the patient's attending breast  surgeon at lymph node dissection/biopsy. No images were obtained.     FINDINGS:  Injection only, no images were " obtained.     Impression: Patient with breast carcinoma undergoing peritumoral Tc-99m  tilmanocept (Lymphoseek) injection for intraoperative sentinel lymph  node localization.     I personally reviewed the images/study and I agree with the findings  as stated. This study was interpreted at [facility], [place], [place].  Electrocardiogram 12 Lead  Sinus rhythm  Inferior infarct, old  Confirmed by Jack Gallegos ([id number]) on 8/23/2023 11:13:59 AM  Pathology report  Surgical Pathology [Apr 28 2023 2:42PM] ([id number])    FINAL DIAGNOSIS   A. LEFT BREAST, CALCIFICATION, STEREOTACTIC CORE NEEDLE BIOPSY:   -- DUCTAL CARCINOMA IN SITU, INTERMEDIATE NUCLEAR GRADE, SOLID AND CRIBRIFORM   PATTERN WITH CALCIFICATIONS, SEE NOTE.       ESTROGEN RECEPTOR (CLONE SP1): POSITIVE   Percentage with Nuclear Staining: >95%   Intensity of Staining: Strong       Surgical Pathology [Jul 25 2023 12:39PM] ([id number])    Specimens: RIGHT BREAST     Name BETY BIGGS            FINAL DIAGNOSIS   A. RIGHT BREAST, NON MASS ENHANCEMENT, MRI GUIDED VACUUM ASSISTED CORE BIOPSY:     -- PERILOBULAR HEMANGIOMA.   -- FOCAL  COLUMNAR CELL CHANGES.     Note: The case was reviewed at Breast Consensus Conference with concurrence.      Surgical Pathology [Aug 28 2023 1:39PM] ([id number])     Specimens: LEFT BREAST LUMPECTOMY- SHORT SUPERIOR, LONG LATERAL /LEFT BREAST - SENTINEL NODE 367 /Received in formalin, labeled with the patient’s name and hospital number     Name BETY BIGGS            FINAL  DIAGNOSIS   A. LEFT BREAST LUMPECTOMY- SHORT SUPERIOR, LONG LATERAL:   -- DUCTAL CARCINOMA IN SITU, SEE SYNOPTIC REPORT.     B. LEFT BREAST - SENTINEL NODE 367:   -- ONE LYMPH NODE, NEGATIVE FOR CARCINOMA (0/1).              CASE SUMMARY REPORT   SPECIMEN   Procedure:   Excision (less than total mastectomy)   Specimen Laterality:   Left     TUMOR   Tumor Site:   Not specified   Histologic Type:   Ductal  carcinoma in situ   Size  (Extent) of DCIS:   Estimated size (extent) of DCIS is at least (Millimeters): 12 mm   Number of Blocks with DCIS: 4   Number of Blocks Examined: 28   Architectural Patterns:   Cribriform   Nuclear Grade:   Grade II (intermediate)    Necrosis:   Present, focal (small foci or single cell necrosis)   Microcalcifications:   Present in DCIS     MARGINS   Margin Status:   All margins negative for DCIS   Distance from DCIS to Closest Margin:   Greater  than: 2 mm     REGIONAL LYMPH NODES   Regional Lymph Node Status:   All regional lymph nodes negative for tumor   Total Number of Lymph Nodes Examined (sentinel and non-sentinel):   1     PATHOLOGIC STAGE CLASSIFICATION (pTNM,  AJCC 8th Edition)   pT Category:   pTis (DCIS)   Regional Lymph Nodes Modifier:   (sn): [place] node(s) evaluated   pN Category:   pN0       Assessment/Plan   1.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  Patient is receiving adjuvant radiotherapy.  After completion of radiotherapy we will start tamoxifen 20 mg p.o. daily.  Possible side effect of tamoxifen including hot flashes, bony pain, risk of blood clot, discussed cervical cancer discussed with the patient in detail.    Tamoxifen will be started 2 weeks after completion of radiotherapy, I will also check a serum estrogen level and FSH, if the patient indeed is postmenopausal, will consider aromatase inhibitors.  Patient will be reevaluated after 11/13 /2023 May we will make a final decision.  Most probably I will start tamoxifen 20 mg p.o. daily.  I spent 50 minutes in the professional and overall care of this patient.    Zoran Hodge MD

## 2023-10-26 ENCOUNTER — HOSPITAL ENCOUNTER (OUTPATIENT)
Dept: RADIATION ONCOLOGY | Facility: CLINIC | Age: 53
Setting detail: RADIATION/ONCOLOGY SERIES
Discharge: STILL ADMITTED OR ADMINISTRATIVE | End: 2023-10-26
Payer: COMMERCIAL

## 2023-10-26 VITALS
WEIGHT: 226 LBS | TEMPERATURE: 97.2 F | OXYGEN SATURATION: 100 % | SYSTOLIC BLOOD PRESSURE: 129 MMHG | BODY MASS INDEX: 35.47 KG/M2 | HEART RATE: 93 BPM | DIASTOLIC BLOOD PRESSURE: 84 MMHG | RESPIRATION RATE: 16 BRPM | HEIGHT: 67 IN

## 2023-10-26 DIAGNOSIS — Z51.0 ENCOUNTER FOR ANTINEOPLASTIC RADIATION THERAPY: ICD-10-CM

## 2023-10-26 DIAGNOSIS — D05.12 INTRADUCTAL CARCINOMA IN SITU OF LEFT BREAST: ICD-10-CM

## 2023-10-26 LAB
FSH SERPL-ACNC: 4.9 IU/L
RAD ONC MSQ ACTUAL FRACTIONS DELIVERED: 8
RAD ONC MSQ ACTUAL SESSION DELIVERED DOSE: 266 CGRAY
RAD ONC MSQ ACTUAL TOTAL DOSE: 2128 CGRAY
RAD ONC MSQ ELAPSED DAYS: 9
RAD ONC MSQ LAST DATE: NORMAL
RAD ONC MSQ PRESCRIBED FRACTIONAL DOSE: 266 CGRAY
RAD ONC MSQ PRESCRIBED NUMBER OF FRACTIONS: 16
RAD ONC MSQ PRESCRIBED TECHNIQUE: NORMAL
RAD ONC MSQ PRESCRIBED TOTAL DOSE: 4256 CGRAY
RAD ONC MSQ PRESCRIPTION PATTERN COMMENT: NORMAL
RAD ONC MSQ START DATE: NORMAL
RAD ONC MSQ TREATMENT COURSE NUMBER: 1
RAD ONC MSQ TREATMENT SITE: NORMAL

## 2023-10-26 PROCEDURE — 77014 CHG CT GUIDANCE RADIATION THERAPY FLDS PLACEMENT: CPT | Performed by: STUDENT IN AN ORGANIZED HEALTH CARE EDUCATION/TRAINING PROGRAM

## 2023-10-26 PROCEDURE — 77387 GUIDANCE FOR RADJ TX DLVR: CPT | Performed by: STUDENT IN AN ORGANIZED HEALTH CARE EDUCATION/TRAINING PROGRAM

## 2023-10-26 PROCEDURE — 77427 RADIATION TX MANAGEMENT X5: CPT | Performed by: STUDENT IN AN ORGANIZED HEALTH CARE EDUCATION/TRAINING PROGRAM

## 2023-10-26 PROCEDURE — 77412 RADIATION TX DELIVERY LVL 3: CPT | Performed by: STUDENT IN AN ORGANIZED HEALTH CARE EDUCATION/TRAINING PROGRAM

## 2023-10-26 ASSESSMENT — PAIN SCALES - GENERAL: PAINLEVEL: 0-NO PAIN

## 2023-10-26 NOTE — PROGRESS NOTES
"Radiation Oncology On Treatment Visit    Patient Name:  Ivelisse Pickens  MRN:  [id number]  :  1970    Referring Provider: No ref. provider found  Primary Care Provider: Chidi Blood DO  Care Team: Patient Care Team:  Chidi Blood DO as PCP - General  Chidi Blood DO as PCP - Employee ACO PCP  Zoran Hodge MD as Consulting Physician (Hematology and Oncology)    Date of Service: 10/26/2023     Diagnosis:   Specialty Problems    None    Treatment Summary:  Radiation Treatments       Active   Lt Breast (Started on 10/17/2023)   Most recent fraction: 266 cGy given on 10/26/2023   Total given: 2,128 cGy / 4,256 cGy  (8 of 16 fractions)   Elapsed Days: 9   Technique: 3D                   SUBJECTIVE:   Patient feels well overall and continues to work full time. She is having slightly more pinkness of the left breast skin, but no peeling or itching. She noticed some more swelling with brief shooting pains in the breast, but very mild. She is not taking any pain medication for it.      OBJECTIVE:   Vital Signs:  /84 (BP Location: Left arm, Patient Position: Sitting, BP Cuff Size: Adult)   Pulse 93   Temp 36.2 °C (97.2 °F) (Skin)   Resp 16   Ht 1.698 m (5' 6.85\")   Wt 103 kg (226 lb)   SpO2 100%   BMI 35.56 kg/m²    Pain Scale: The patient's current pain level was assessed.  They report currently having a pain of 0 out of 10.    Other Pertinent Findings:     Toxicity Assessment          10/19/2023    15:43 10/26/2023    15:13   Toxicity Assessment   Treatment Site Breast Breast   Anorexia Grade 0 Grade 0   Anxiety Grade 0 Grade 0   Dehydration Grade 0 Grade 0   Depression Grade 0 Grade 0   Dermatitis Radiation Grade 0       aloe vesta given Grade 1       mild pinkish erythema to tx area per patient   Diarrhea Grade 0 Grade 0   Fatigue Grade 0 Grade 0   Fibrosis Deep Connective Tissue Grade 0 Grade 0   Fracture Grade 0 Grade 0   Nausea Grade 0 Grade 0   Pain Grade 0 Grade 0   Treatment Related " Secondary Malignancy Grade 0 Grade 0   Tumor Pain Grade 0 Grade 0   Vomiting Grade 0 Grade 0   Abdominal Pain Grade 0    Dysphagia Grade 0    Esophagitis Grade 0    Gastric Hemorrhage Grade 0    Mucositis Oral Grade 0    Dry Mouth Grade 0 Grade 0   Breast Infection Grade 0 Grade 0   Seroma Grade 0 Grade 0   Joint Range of Motion Decreased Grade 0 Grade 0   Joint Range of Motion Decreased Lumbar Spine Grade 0 Grade 0   Brachial Plexopathy Grade 0 Grade 0   Breast Atrophy Grade 0 Grade 0   Breast Pain Grade 0 Grade 0   Nipple Deformity Grade 0 Grade 0   Pneumonitis Grade 0 Grade 0   Edema Limbs Grade 0 Grade 0   Lymphedema Grade 0 Grade 0   Thromboembolic Event Grade 0 Grade 0   Hot Flashes Grade 0 Grade 0        Assessment / Plan:  The patient is tolerating radiation therapy as anticipated.  Continue per current treatment plan.

## 2023-10-27 ENCOUNTER — HOSPITAL ENCOUNTER (OUTPATIENT)
Dept: RADIATION ONCOLOGY | Facility: CLINIC | Age: 53
Setting detail: RADIATION/ONCOLOGY SERIES
Discharge: STILL ADMITTED OR ADMINISTRATIVE | End: 2023-10-27
Payer: COMMERCIAL

## 2023-10-27 DIAGNOSIS — D05.12 INTRADUCTAL CARCINOMA IN SITU OF LEFT BREAST: ICD-10-CM

## 2023-10-27 DIAGNOSIS — Z51.0 ENCOUNTER FOR ANTINEOPLASTIC RADIATION THERAPY: ICD-10-CM

## 2023-10-27 LAB
RAD ONC MSQ ACTUAL FRACTIONS DELIVERED: 9
RAD ONC MSQ ACTUAL SESSION DELIVERED DOSE: 266 CGRAY
RAD ONC MSQ ACTUAL TOTAL DOSE: 2394 CGRAY
RAD ONC MSQ ELAPSED DAYS: 10
RAD ONC MSQ LAST DATE: NORMAL
RAD ONC MSQ PRESCRIBED FRACTIONAL DOSE: 266 CGRAY
RAD ONC MSQ PRESCRIBED NUMBER OF FRACTIONS: 16
RAD ONC MSQ PRESCRIBED TECHNIQUE: NORMAL
RAD ONC MSQ PRESCRIBED TOTAL DOSE: 4256 CGRAY
RAD ONC MSQ PRESCRIPTION PATTERN COMMENT: NORMAL
RAD ONC MSQ START DATE: NORMAL
RAD ONC MSQ TREATMENT COURSE NUMBER: 1
RAD ONC MSQ TREATMENT SITE: NORMAL

## 2023-10-27 PROCEDURE — 77412 RADIATION TX DELIVERY LVL 3: CPT | Performed by: STUDENT IN AN ORGANIZED HEALTH CARE EDUCATION/TRAINING PROGRAM

## 2023-10-27 PROCEDURE — 77014 CHG CT GUIDANCE RADIATION THERAPY FLDS PLACEMENT: CPT | Performed by: STUDENT IN AN ORGANIZED HEALTH CARE EDUCATION/TRAINING PROGRAM

## 2023-10-27 PROCEDURE — 77387 GUIDANCE FOR RADJ TX DLVR: CPT | Performed by: STUDENT IN AN ORGANIZED HEALTH CARE EDUCATION/TRAINING PROGRAM

## 2023-10-29 LAB — ESTROGEN SERPL-MCNC: 107 PG/ML

## 2023-10-30 ENCOUNTER — HOSPITAL ENCOUNTER (OUTPATIENT)
Dept: RADIATION ONCOLOGY | Facility: CLINIC | Age: 53
Setting detail: RADIATION/ONCOLOGY SERIES
Discharge: STILL ADMITTED OR ADMINISTRATIVE | End: 2023-10-30
Payer: COMMERCIAL

## 2023-10-30 DIAGNOSIS — D05.12 INTRADUCTAL CARCINOMA IN SITU OF LEFT BREAST: ICD-10-CM

## 2023-10-30 DIAGNOSIS — Z51.0 ENCOUNTER FOR ANTINEOPLASTIC RADIATION THERAPY: ICD-10-CM

## 2023-10-30 LAB
RAD ONC MSQ ACTUAL FRACTIONS DELIVERED: 10
RAD ONC MSQ ACTUAL SESSION DELIVERED DOSE: 266 CGRAY
RAD ONC MSQ ACTUAL TOTAL DOSE: 2660 CGRAY
RAD ONC MSQ ELAPSED DAYS: 13
RAD ONC MSQ LAST DATE: NORMAL
RAD ONC MSQ PRESCRIBED FRACTIONAL DOSE: 266 CGRAY
RAD ONC MSQ PRESCRIBED NUMBER OF FRACTIONS: 16
RAD ONC MSQ PRESCRIBED TECHNIQUE: NORMAL
RAD ONC MSQ PRESCRIBED TOTAL DOSE: 4256 CGRAY
RAD ONC MSQ PRESCRIPTION PATTERN COMMENT: NORMAL
RAD ONC MSQ START DATE: NORMAL
RAD ONC MSQ TREATMENT COURSE NUMBER: 1
RAD ONC MSQ TREATMENT SITE: NORMAL

## 2023-10-30 PROCEDURE — 77387 GUIDANCE FOR RADJ TX DLVR: CPT | Performed by: STUDENT IN AN ORGANIZED HEALTH CARE EDUCATION/TRAINING PROGRAM

## 2023-10-30 PROCEDURE — 77014 CHG CT GUIDANCE RADIATION THERAPY FLDS PLACEMENT: CPT | Performed by: STUDENT IN AN ORGANIZED HEALTH CARE EDUCATION/TRAINING PROGRAM

## 2023-10-30 PROCEDURE — 77412 RADIATION TX DELIVERY LVL 3: CPT | Performed by: STUDENT IN AN ORGANIZED HEALTH CARE EDUCATION/TRAINING PROGRAM

## 2023-10-31 ENCOUNTER — HOSPITAL ENCOUNTER (OUTPATIENT)
Dept: RADIATION ONCOLOGY | Facility: CLINIC | Age: 53
Setting detail: RADIATION/ONCOLOGY SERIES
Discharge: STILL ADMITTED OR ADMINISTRATIVE | End: 2023-10-31
Payer: COMMERCIAL

## 2023-10-31 DIAGNOSIS — Z51.0 ENCOUNTER FOR ANTINEOPLASTIC RADIATION THERAPY: ICD-10-CM

## 2023-10-31 DIAGNOSIS — D05.12 INTRADUCTAL CARCINOMA IN SITU OF LEFT BREAST: ICD-10-CM

## 2023-10-31 LAB
RAD ONC MSQ ACTUAL FRACTIONS DELIVERED: 11
RAD ONC MSQ ACTUAL SESSION DELIVERED DOSE: 266 CGRAY
RAD ONC MSQ ACTUAL TOTAL DOSE: 2926 CGRAY
RAD ONC MSQ ELAPSED DAYS: 14
RAD ONC MSQ LAST DATE: NORMAL
RAD ONC MSQ PRESCRIBED FRACTIONAL DOSE: 266 CGRAY
RAD ONC MSQ PRESCRIBED NUMBER OF FRACTIONS: 16
RAD ONC MSQ PRESCRIBED TECHNIQUE: NORMAL
RAD ONC MSQ PRESCRIBED TOTAL DOSE: 4256 CGRAY
RAD ONC MSQ PRESCRIPTION PATTERN COMMENT: NORMAL
RAD ONC MSQ START DATE: NORMAL
RAD ONC MSQ TREATMENT COURSE NUMBER: 1
RAD ONC MSQ TREATMENT SITE: NORMAL

## 2023-10-31 PROCEDURE — 77014 CHG CT GUIDANCE RADIATION THERAPY FLDS PLACEMENT: CPT | Performed by: STUDENT IN AN ORGANIZED HEALTH CARE EDUCATION/TRAINING PROGRAM

## 2023-10-31 PROCEDURE — 77387 GUIDANCE FOR RADJ TX DLVR: CPT | Performed by: STUDENT IN AN ORGANIZED HEALTH CARE EDUCATION/TRAINING PROGRAM

## 2023-10-31 PROCEDURE — 77412 RADIATION TX DELIVERY LVL 3: CPT | Performed by: STUDENT IN AN ORGANIZED HEALTH CARE EDUCATION/TRAINING PROGRAM

## 2023-11-01 ENCOUNTER — PHARMACY VISIT (OUTPATIENT)
Dept: PHARMACY | Facility: CLINIC | Age: 53
End: 2023-11-01
Payer: COMMERCIAL

## 2023-11-01 LAB
RAD ONC MSQ ACTUAL FRACTIONS DELIVERED: 12
RAD ONC MSQ ACTUAL SESSION DELIVERED DOSE: 266 CGRAY
RAD ONC MSQ ACTUAL TOTAL DOSE: 3192 CGRAY
RAD ONC MSQ ELAPSED DAYS: 15
RAD ONC MSQ LAST DATE: NORMAL
RAD ONC MSQ PRESCRIBED FRACTIONAL DOSE: 266 CGRAY
RAD ONC MSQ PRESCRIBED NUMBER OF FRACTIONS: 16
RAD ONC MSQ PRESCRIBED TECHNIQUE: NORMAL
RAD ONC MSQ PRESCRIBED TOTAL DOSE: 4256 CGRAY
RAD ONC MSQ PRESCRIPTION PATTERN COMMENT: NORMAL
RAD ONC MSQ START DATE: NORMAL
RAD ONC MSQ TREATMENT COURSE NUMBER: 1
RAD ONC MSQ TREATMENT SITE: NORMAL

## 2023-11-01 PROCEDURE — 77387 GUIDANCE FOR RADJ TX DLVR: CPT | Performed by: STUDENT IN AN ORGANIZED HEALTH CARE EDUCATION/TRAINING PROGRAM

## 2023-11-01 PROCEDURE — RXMED WILLOW AMBULATORY MEDICATION CHARGE

## 2023-11-01 PROCEDURE — 77412 RADIATION TX DELIVERY LVL 3: CPT | Performed by: STUDENT IN AN ORGANIZED HEALTH CARE EDUCATION/TRAINING PROGRAM

## 2023-11-01 PROCEDURE — 77014 CHG CT GUIDANCE RADIATION THERAPY FLDS PLACEMENT: CPT | Performed by: STUDENT IN AN ORGANIZED HEALTH CARE EDUCATION/TRAINING PROGRAM

## 2023-11-02 ENCOUNTER — HOSPITAL ENCOUNTER (OUTPATIENT)
Dept: RADIATION ONCOLOGY | Facility: CLINIC | Age: 53
Setting detail: RADIATION/ONCOLOGY SERIES
Discharge: STILL ADMITTED OR ADMINISTRATIVE | End: 2023-11-02
Payer: COMMERCIAL

## 2023-11-02 VITALS
DIASTOLIC BLOOD PRESSURE: 86 MMHG | WEIGHT: 224.5 LBS | TEMPERATURE: 97.5 F | SYSTOLIC BLOOD PRESSURE: 141 MMHG | BODY MASS INDEX: 35.32 KG/M2 | RESPIRATION RATE: 16 BRPM | OXYGEN SATURATION: 96 % | HEART RATE: 90 BPM

## 2023-11-02 DIAGNOSIS — Z51.0 ENCOUNTER FOR ANTINEOPLASTIC RADIATION THERAPY: ICD-10-CM

## 2023-11-02 DIAGNOSIS — D05.12 INTRADUCTAL CARCINOMA IN SITU OF LEFT BREAST: ICD-10-CM

## 2023-11-02 LAB
RAD ONC MSQ ACTUAL FRACTIONS DELIVERED: 13
RAD ONC MSQ ACTUAL SESSION DELIVERED DOSE: 266 CGRAY
RAD ONC MSQ ACTUAL TOTAL DOSE: 3458 CGRAY
RAD ONC MSQ ELAPSED DAYS: 16
RAD ONC MSQ LAST DATE: NORMAL
RAD ONC MSQ PRESCRIBED FRACTIONAL DOSE: 266 CGRAY
RAD ONC MSQ PRESCRIBED NUMBER OF FRACTIONS: 16
RAD ONC MSQ PRESCRIBED TECHNIQUE: NORMAL
RAD ONC MSQ PRESCRIBED TOTAL DOSE: 4256 CGRAY
RAD ONC MSQ PRESCRIPTION PATTERN COMMENT: NORMAL
RAD ONC MSQ START DATE: NORMAL
RAD ONC MSQ TREATMENT COURSE NUMBER: 1
RAD ONC MSQ TREATMENT SITE: NORMAL

## 2023-11-02 PROCEDURE — 77014 CHG CT GUIDANCE RADIATION THERAPY FLDS PLACEMENT: CPT | Performed by: STUDENT IN AN ORGANIZED HEALTH CARE EDUCATION/TRAINING PROGRAM

## 2023-11-02 PROCEDURE — 77427 RADIATION TX MANAGEMENT X5: CPT | Performed by: STUDENT IN AN ORGANIZED HEALTH CARE EDUCATION/TRAINING PROGRAM

## 2023-11-02 PROCEDURE — 77412 RADIATION TX DELIVERY LVL 3: CPT | Performed by: STUDENT IN AN ORGANIZED HEALTH CARE EDUCATION/TRAINING PROGRAM

## 2023-11-02 PROCEDURE — 77387 GUIDANCE FOR RADJ TX DLVR: CPT | Performed by: STUDENT IN AN ORGANIZED HEALTH CARE EDUCATION/TRAINING PROGRAM

## 2023-11-02 ASSESSMENT — ENCOUNTER SYMPTOMS
LOSS OF SENSATION IN FEET: 0
DEPRESSION: 0
OCCASIONAL FEELINGS OF UNSTEADINESS: 0

## 2023-11-02 ASSESSMENT — PAIN SCALES - GENERAL: PAINLEVEL: 0-NO PAIN

## 2023-11-02 NOTE — PROGRESS NOTES
Radiation Oncology On Treatment Visit    Patient Name:  Ivelisse Pickens  MRN:  [id number]  :  1970    Referring Provider: No ref. provider found  Primary Care Provider: Chidi Blood DO  Care Team: Patient Care Team:  Chidi Blood DO as PCP - General  Chidi Blood DO as PCP - Employee ACO PCP  Zoran Hodge MD as Consulting Physician (Hematology and Oncology)    Date of Service: 2023     Diagnosis:   Specialty Problems    None    Treatment Summary:  Radiation Treatments       Active   Lt Breast (Started on 10/17/2023)   Most recent fraction: 266 cGy given on 2023   Total given: 3,458 cGy / 4,256 cGy  (13 of 16 fractions)   Elapsed Days: 16   Technique: 3D                   SUBJECTIVE:   Patient feels well overall. The breast swelling with occasional shooting pains is about the same, and it is not bothering her during work. The skin over the left breast is a little pink but she has no peeling.     OBJECTIVE:   Vital Signs:  /86 (BP Location: Left arm, Patient Position: Sitting, BP Cuff Size: Adult)   Pulse 90   Temp 36.4 °C (97.5 °F) (Skin)   Resp 16   Wt 102 kg (224 lb 8 oz)   SpO2 96%   BMI 35.32 kg/m²    Pain Scale: The patient's current pain level was assessed.  They report currently having a pain of 0 out of 10.    Other Pertinent Findings:     Toxicity Assessment          10/19/2023    15:43 10/26/2023    15:13   Toxicity Assessment   Treatment Site Breast Breast   Anorexia Grade 0 Grade 0   Anxiety Grade 0 Grade 0   Dehydration Grade 0 Grade 0   Depression Grade 0 Grade 0   Dermatitis Radiation Grade 0       aloe vesta given Grade 1       mild pinkish erythema to tx area per patient   Diarrhea Grade 0 Grade 0   Fatigue Grade 0 Grade 0   Fibrosis Deep Connective Tissue Grade 0 Grade 0   Fracture Grade 0 Grade 0   Nausea Grade 0 Grade 0   Pain Grade 0 Grade 0   Treatment Related Secondary Malignancy Grade 0 Grade 0   Tumor Pain Grade 0 Grade 0   Vomiting Grade 0 Grade 0    Abdominal Pain Grade 0    Dysphagia Grade 0    Esophagitis Grade 0    Gastric Hemorrhage Grade 0    Mucositis Oral Grade 0    Dry Mouth Grade 0 Grade 0   Breast Infection Grade 0 Grade 0   Seroma Grade 0 Grade 0   Joint Range of Motion Decreased Grade 0 Grade 0   Joint Range of Motion Decreased Lumbar Spine Grade 0 Grade 0   Brachial Plexopathy Grade 0 Grade 0   Breast Atrophy Grade 0 Grade 0   Breast Pain Grade 0 Grade 0   Nipple Deformity Grade 0 Grade 0   Pneumonitis Grade 0 Grade 0   Edema Limbs Grade 0 Grade 0   Lymphedema Grade 0 Grade 0   Thromboembolic Event Grade 0 Grade 0   Hot Flashes Grade 0 Grade 0        Assessment / Plan:  The patient is tolerating radiation therapy as anticipated.  Continue per current treatment plan.

## 2023-11-03 ENCOUNTER — HOSPITAL ENCOUNTER (OUTPATIENT)
Dept: RADIATION ONCOLOGY | Facility: CLINIC | Age: 53
Setting detail: RADIATION/ONCOLOGY SERIES
Discharge: STILL ADMITTED OR ADMINISTRATIVE | End: 2023-11-03
Payer: COMMERCIAL

## 2023-11-03 DIAGNOSIS — Z51.0 ENCOUNTER FOR ANTINEOPLASTIC RADIATION THERAPY: ICD-10-CM

## 2023-11-03 DIAGNOSIS — D05.12 INTRADUCTAL CARCINOMA IN SITU OF LEFT BREAST: ICD-10-CM

## 2023-11-03 LAB
RAD ONC MSQ ACTUAL FRACTIONS DELIVERED: 14
RAD ONC MSQ ACTUAL SESSION DELIVERED DOSE: 266 CGRAY
RAD ONC MSQ ACTUAL TOTAL DOSE: 3724 CGRAY
RAD ONC MSQ ELAPSED DAYS: 17
RAD ONC MSQ LAST DATE: NORMAL
RAD ONC MSQ PRESCRIBED FRACTIONAL DOSE: 266 CGRAY
RAD ONC MSQ PRESCRIBED NUMBER OF FRACTIONS: 16
RAD ONC MSQ PRESCRIBED TECHNIQUE: NORMAL
RAD ONC MSQ PRESCRIBED TOTAL DOSE: 4256 CGRAY
RAD ONC MSQ PRESCRIPTION PATTERN COMMENT: NORMAL
RAD ONC MSQ START DATE: NORMAL
RAD ONC MSQ TREATMENT COURSE NUMBER: 1
RAD ONC MSQ TREATMENT SITE: NORMAL

## 2023-11-03 PROCEDURE — 77387 GUIDANCE FOR RADJ TX DLVR: CPT | Performed by: STUDENT IN AN ORGANIZED HEALTH CARE EDUCATION/TRAINING PROGRAM

## 2023-11-03 PROCEDURE — 77014 CHG CT GUIDANCE RADIATION THERAPY FLDS PLACEMENT: CPT | Performed by: STUDENT IN AN ORGANIZED HEALTH CARE EDUCATION/TRAINING PROGRAM

## 2023-11-03 PROCEDURE — 77412 RADIATION TX DELIVERY LVL 3: CPT | Performed by: STUDENT IN AN ORGANIZED HEALTH CARE EDUCATION/TRAINING PROGRAM

## 2023-11-06 ENCOUNTER — HOSPITAL ENCOUNTER (OUTPATIENT)
Dept: RADIATION ONCOLOGY | Facility: CLINIC | Age: 53
Setting detail: RADIATION/ONCOLOGY SERIES
Discharge: STILL ADMITTED OR ADMINISTRATIVE | End: 2023-11-06
Payer: COMMERCIAL

## 2023-11-06 DIAGNOSIS — D05.12 INTRADUCTAL CARCINOMA IN SITU OF LEFT BREAST: ICD-10-CM

## 2023-11-06 DIAGNOSIS — Z51.0 ENCOUNTER FOR ANTINEOPLASTIC RADIATION THERAPY: ICD-10-CM

## 2023-11-06 LAB
RAD ONC MSQ ACTUAL FRACTIONS DELIVERED: 15
RAD ONC MSQ ACTUAL SESSION DELIVERED DOSE: 266 CGRAY
RAD ONC MSQ ACTUAL TOTAL DOSE: 3990 CGRAY
RAD ONC MSQ ELAPSED DAYS: 20
RAD ONC MSQ LAST DATE: NORMAL
RAD ONC MSQ PRESCRIBED FRACTIONAL DOSE: 266 CGRAY
RAD ONC MSQ PRESCRIBED NUMBER OF FRACTIONS: 16
RAD ONC MSQ PRESCRIBED TECHNIQUE: NORMAL
RAD ONC MSQ PRESCRIBED TOTAL DOSE: 4256 CGRAY
RAD ONC MSQ PRESCRIPTION PATTERN COMMENT: NORMAL
RAD ONC MSQ START DATE: NORMAL
RAD ONC MSQ TREATMENT COURSE NUMBER: 1
RAD ONC MSQ TREATMENT SITE: NORMAL

## 2023-11-06 PROCEDURE — 77412 RADIATION TX DELIVERY LVL 3: CPT | Performed by: STUDENT IN AN ORGANIZED HEALTH CARE EDUCATION/TRAINING PROGRAM

## 2023-11-06 PROCEDURE — 77387 GUIDANCE FOR RADJ TX DLVR: CPT | Performed by: STUDENT IN AN ORGANIZED HEALTH CARE EDUCATION/TRAINING PROGRAM

## 2023-11-06 PROCEDURE — 77014 CHG CT GUIDANCE RADIATION THERAPY FLDS PLACEMENT: CPT | Performed by: STUDENT IN AN ORGANIZED HEALTH CARE EDUCATION/TRAINING PROGRAM

## 2023-11-07 ENCOUNTER — HOSPITAL ENCOUNTER (OUTPATIENT)
Dept: RADIATION ONCOLOGY | Facility: CLINIC | Age: 53
Setting detail: RADIATION/ONCOLOGY SERIES
Discharge: HOME | End: 2023-11-07
Payer: COMMERCIAL

## 2023-11-07 ENCOUNTER — APPOINTMENT (OUTPATIENT)
Dept: RADIATION ONCOLOGY | Facility: CLINIC | Age: 53
End: 2023-11-07
Payer: COMMERCIAL

## 2023-11-07 PROCEDURE — 77014 CHG CT GUIDANCE RADIATION THERAPY FLDS PLACEMENT: CPT | Performed by: STUDENT IN AN ORGANIZED HEALTH CARE EDUCATION/TRAINING PROGRAM

## 2023-11-07 PROCEDURE — 77387 GUIDANCE FOR RADJ TX DLVR: CPT | Performed by: STUDENT IN AN ORGANIZED HEALTH CARE EDUCATION/TRAINING PROGRAM

## 2023-11-07 PROCEDURE — 77412 RADIATION TX DELIVERY LVL 3: CPT | Performed by: STUDENT IN AN ORGANIZED HEALTH CARE EDUCATION/TRAINING PROGRAM

## 2023-11-08 ENCOUNTER — HOSPITAL ENCOUNTER (OUTPATIENT)
Dept: RADIATION ONCOLOGY | Facility: CLINIC | Age: 53
Setting detail: RADIATION/ONCOLOGY SERIES
Discharge: HOME | End: 2023-11-08
Payer: COMMERCIAL

## 2023-11-08 DIAGNOSIS — D05.12 INTRADUCTAL CARCINOMA IN SITU OF LEFT BREAST: ICD-10-CM

## 2023-11-08 DIAGNOSIS — Z51.0 ENCOUNTER FOR ANTINEOPLASTIC RADIATION THERAPY: ICD-10-CM

## 2023-11-08 LAB
RAD ONC MSQ ACTUAL FRACTIONS DELIVERED: 1
RAD ONC MSQ ACTUAL SESSION DELIVERED DOSE: 250 CGRAY
RAD ONC MSQ ACTUAL TOTAL DOSE: 250 CGRAY
RAD ONC MSQ ELAPSED DAYS: 0
RAD ONC MSQ LAST DATE: NORMAL
RAD ONC MSQ PRESCRIBED FRACTIONAL DOSE: 250 CGRAY
RAD ONC MSQ PRESCRIBED NUMBER OF FRACTIONS: 4
RAD ONC MSQ PRESCRIBED TECHNIQUE: NORMAL
RAD ONC MSQ PRESCRIBED TOTAL DOSE: 1000 CGRAY
RAD ONC MSQ PRESCRIPTION PATTERN COMMENT: NORMAL
RAD ONC MSQ START DATE: NORMAL
RAD ONC MSQ TREATMENT COURSE NUMBER: 1
RAD ONC MSQ TREATMENT SITE: NORMAL

## 2023-11-08 PROCEDURE — 77280 THER RAD SIMULAJ FIELD SMPL: CPT | Performed by: STUDENT IN AN ORGANIZED HEALTH CARE EDUCATION/TRAINING PROGRAM

## 2023-11-08 PROCEDURE — 77412 RADIATION TX DELIVERY LVL 3: CPT | Performed by: STUDENT IN AN ORGANIZED HEALTH CARE EDUCATION/TRAINING PROGRAM

## 2023-11-08 PROCEDURE — 77336 RADIATION PHYSICS CONSULT: CPT | Mod: 59 | Performed by: STUDENT IN AN ORGANIZED HEALTH CARE EDUCATION/TRAINING PROGRAM

## 2023-11-09 ENCOUNTER — HOSPITAL ENCOUNTER (OUTPATIENT)
Dept: RADIATION ONCOLOGY | Facility: CLINIC | Age: 53
Setting detail: RADIATION/ONCOLOGY SERIES
Discharge: HOME | End: 2023-11-09
Payer: COMMERCIAL

## 2023-11-09 VITALS
RESPIRATION RATE: 16 BRPM | HEART RATE: 86 BPM | WEIGHT: 225.7 LBS | DIASTOLIC BLOOD PRESSURE: 89 MMHG | SYSTOLIC BLOOD PRESSURE: 128 MMHG | TEMPERATURE: 97.2 F | OXYGEN SATURATION: 96 % | BODY MASS INDEX: 35.51 KG/M2

## 2023-11-09 DIAGNOSIS — D05.12 INTRADUCTAL CARCINOMA IN SITU OF LEFT BREAST: ICD-10-CM

## 2023-11-09 DIAGNOSIS — Z51.0 ENCOUNTER FOR ANTINEOPLASTIC RADIATION THERAPY: ICD-10-CM

## 2023-11-09 LAB
RAD ONC MSQ ACTUAL FRACTIONS DELIVERED: 2
RAD ONC MSQ ACTUAL SESSION DELIVERED DOSE: 250 CGRAY
RAD ONC MSQ ACTUAL TOTAL DOSE: 500 CGRAY
RAD ONC MSQ ELAPSED DAYS: 1
RAD ONC MSQ LAST DATE: NORMAL
RAD ONC MSQ PRESCRIBED FRACTIONAL DOSE: 250 CGRAY
RAD ONC MSQ PRESCRIBED NUMBER OF FRACTIONS: 4
RAD ONC MSQ PRESCRIBED TECHNIQUE: NORMAL
RAD ONC MSQ PRESCRIBED TOTAL DOSE: 1000 CGRAY
RAD ONC MSQ PRESCRIPTION PATTERN COMMENT: NORMAL
RAD ONC MSQ START DATE: NORMAL
RAD ONC MSQ TREATMENT COURSE NUMBER: 1
RAD ONC MSQ TREATMENT SITE: NORMAL

## 2023-11-09 PROCEDURE — 77412 RADIATION TX DELIVERY LVL 3: CPT | Performed by: STUDENT IN AN ORGANIZED HEALTH CARE EDUCATION/TRAINING PROGRAM

## 2023-11-09 PROCEDURE — 77387 GUIDANCE FOR RADJ TX DLVR: CPT | Performed by: STUDENT IN AN ORGANIZED HEALTH CARE EDUCATION/TRAINING PROGRAM

## 2023-11-09 PROCEDURE — 77427 RADIATION TX MANAGEMENT X5: CPT | Performed by: STUDENT IN AN ORGANIZED HEALTH CARE EDUCATION/TRAINING PROGRAM

## 2023-11-09 PROCEDURE — 77014 CHG CT GUIDANCE RADIATION THERAPY FLDS PLACEMENT: CPT | Performed by: STUDENT IN AN ORGANIZED HEALTH CARE EDUCATION/TRAINING PROGRAM

## 2023-11-09 ASSESSMENT — ENCOUNTER SYMPTOMS
DEPRESSION: 0
OCCASIONAL FEELINGS OF UNSTEADINESS: 0
LOSS OF SENSATION IN FEET: 0

## 2023-11-09 NOTE — PROGRESS NOTES
Radiation Oncology On Treatment Visit    Patient Name:  Ivelisse Pickens  MRN:  [id number]  :  1970    Referring Provider: No ref. provider found  Primary Care Provider: Chidi Blood DO  Care Team: Patient Care Team:  Chidi Blood DO as PCP - General  Chidi Blood DO as PCP - Employee ACO PCP  Zoran Hodge MD as Consulting Physician (Hematology and Oncology)    Date of Service: 2023     Diagnosis:   Specialty Problems    None    Treatment Summary:  Radiation Treatments       Active   Lt Breast (Started on 10/17/2023)   Most recent fraction: 266 cGy given on 2023   Total given: 3,990 cGy / 4,256 cGy  (15 of 16 fractions)   Elapsed Days: 20   Technique: 3D        Tumor Bed Boost (Started on 2023)   Most recent fraction: 250 cGy given on 2023   Total given: 500 cGy / 1,000 cGy  (2 of 4 fractions)   Elapsed Days: 1   Technique: 3D                   SUBJECTIVE:   Patient feels well overall. She is having some more erythema over the left breast, but no desquamation or swelling. Her energy is good and she continues to work full time.      OBJECTIVE:   Vital Signs:  /89 (BP Location: Left arm, Patient Position: Sitting, BP Cuff Size: Adult)   Pulse 86   Temp 36.2 °C (97.2 °F) (Skin)   Resp 16   Wt 102 kg (225 lb 11.2 oz)   SpO2 96%   BMI 35.51 kg/m²    Pain Scale: The patient's current pain level was assessed.  They report currently having a pain of 0 out of 10.    Other Pertinent Findings:     Toxicity Assessment          10/19/2023    15:43 10/26/2023    15:13 2023    14:58 2023    15:27   Toxicity Assessment   Treatment Site Breast Breast Breast Breast   Anorexia Grade 0 Grade 0 Grade 0 Grade 0   Anxiety Grade 0 Grade 0 Grade 0 Grade 0   Dehydration Grade 0 Grade 0 Grade 0 Grade 0   Depression Grade 0 Grade 0 Grade 0 Grade 0   Dermatitis Radiation Grade 0       aloe vesta given Grade 1       mild pinkish erythema to tx area per patient Grade 1 Grade 1   Diarrhea  Grade 0 Grade 0 Grade 0 Grade 0   Fatigue Grade 0 Grade 0 Grade 0 Grade 0   Fibrosis Deep Connective Tissue Grade 0 Grade 0 Grade 0 Grade 0   Fracture Grade 0 Grade 0 Grade 0 Grade 0   Nausea Grade 0 Grade 0 Grade 0 Grade 0   Pain Grade 0 Grade 0 Grade 0 Grade 0   Treatment Related Secondary Malignancy Grade 0 Grade 0 Grade 0 Grade 0   Tumor Pain Grade 0 Grade 0 Grade 0 Grade 0   Vomiting Grade 0 Grade 0 Grade 0 Grade 0   Abdominal Pain Grade 0      Dysphagia Grade 0      Esophagitis Grade 0      Gastric Hemorrhage Grade 0      Mucositis Oral Grade 0      Dry Mouth Grade 0 Grade 0 Grade 0 Grade 0   Breast Infection Grade 0 Grade 0 Grade 0 Grade 0   Seroma Grade 0 Grade 0 Grade 0 Grade 0   Joint Range of Motion Decreased Grade 0 Grade 0 Grade 0 Grade 0   Joint Range of Motion Decreased Lumbar Spine Grade 0 Grade 0 Grade 0 Grade 0   Brachial Plexopathy Grade 0 Grade 0 Grade 0 Grade 0   Breast Atrophy Grade 0 Grade 0 Grade 0 Grade 0   Breast Pain Grade 0 Grade 0 Grade 0 Grade 0   Nipple Deformity Grade 0 Grade 0 Grade 0 Grade 0   Pneumonitis Grade 0 Grade 0 Grade 0 Grade 0   Edema Limbs Grade 0 Grade 0 Grade 0 Grade 0   Lymphedema Grade 0 Grade 0 Grade 0 Grade 0   Thromboembolic Event Grade 0 Grade 0 Grade 0 Grade 0   Hot Flashes Grade 0 Grade 0 Grade 0 Grade 0        Assessment / Plan:  The patient is tolerating radiation therapy as anticipated.  Continue per current treatment plan. She will complete breast radiation next week, and I will see her for post-treatment follow up in about one month. Advised her to call if she develops desquamation.

## 2023-11-10 ENCOUNTER — HOSPITAL ENCOUNTER (OUTPATIENT)
Dept: RADIATION ONCOLOGY | Facility: CLINIC | Age: 53
Setting detail: RADIATION/ONCOLOGY SERIES
Discharge: HOME | End: 2023-11-10
Payer: COMMERCIAL

## 2023-11-10 DIAGNOSIS — D05.12 INTRADUCTAL CARCINOMA IN SITU OF LEFT BREAST: ICD-10-CM

## 2023-11-10 DIAGNOSIS — Z51.0 ENCOUNTER FOR ANTINEOPLASTIC RADIATION THERAPY: ICD-10-CM

## 2023-11-10 LAB
RAD ONC MSQ ACTUAL FRACTIONS DELIVERED: 3
RAD ONC MSQ ACTUAL SESSION DELIVERED DOSE: 250 CGRAY
RAD ONC MSQ ACTUAL TOTAL DOSE: 750 CGRAY
RAD ONC MSQ ELAPSED DAYS: 2
RAD ONC MSQ LAST DATE: NORMAL
RAD ONC MSQ PRESCRIBED FRACTIONAL DOSE: 250 CGRAY
RAD ONC MSQ PRESCRIBED NUMBER OF FRACTIONS: 4
RAD ONC MSQ PRESCRIBED TECHNIQUE: NORMAL
RAD ONC MSQ PRESCRIBED TOTAL DOSE: 1000 CGRAY
RAD ONC MSQ PRESCRIPTION PATTERN COMMENT: NORMAL
RAD ONC MSQ START DATE: NORMAL
RAD ONC MSQ TREATMENT COURSE NUMBER: 1
RAD ONC MSQ TREATMENT SITE: NORMAL

## 2023-11-10 PROCEDURE — 77014 CHG CT GUIDANCE RADIATION THERAPY FLDS PLACEMENT: CPT | Performed by: STUDENT IN AN ORGANIZED HEALTH CARE EDUCATION/TRAINING PROGRAM

## 2023-11-10 PROCEDURE — 77412 RADIATION TX DELIVERY LVL 3: CPT | Performed by: STUDENT IN AN ORGANIZED HEALTH CARE EDUCATION/TRAINING PROGRAM

## 2023-11-10 PROCEDURE — 77387 GUIDANCE FOR RADJ TX DLVR: CPT | Performed by: STUDENT IN AN ORGANIZED HEALTH CARE EDUCATION/TRAINING PROGRAM

## 2023-11-13 ENCOUNTER — HOSPITAL ENCOUNTER (OUTPATIENT)
Dept: RADIATION ONCOLOGY | Facility: CLINIC | Age: 53
Setting detail: RADIATION/ONCOLOGY SERIES
Discharge: HOME | End: 2023-11-13
Payer: COMMERCIAL

## 2023-11-13 ENCOUNTER — DOCUMENTATION (OUTPATIENT)
Dept: RADIATION ONCOLOGY | Facility: CLINIC | Age: 53
End: 2023-11-13

## 2023-11-13 DIAGNOSIS — D05.12 INTRADUCTAL CARCINOMA IN SITU OF LEFT BREAST: ICD-10-CM

## 2023-11-13 DIAGNOSIS — Z51.0 ENCOUNTER FOR ANTINEOPLASTIC RADIATION THERAPY: ICD-10-CM

## 2023-11-13 LAB
RAD ONC MSQ ACTUAL FRACTIONS DELIVERED: 4
RAD ONC MSQ ACTUAL SESSION DELIVERED DOSE: 250 CGRAY
RAD ONC MSQ ACTUAL TOTAL DOSE: 1000 CGRAY
RAD ONC MSQ ELAPSED DAYS: 5
RAD ONC MSQ LAST DATE: NORMAL
RAD ONC MSQ PRESCRIBED FRACTIONAL DOSE: 250 CGRAY
RAD ONC MSQ PRESCRIBED NUMBER OF FRACTIONS: 4
RAD ONC MSQ PRESCRIBED TECHNIQUE: NORMAL
RAD ONC MSQ PRESCRIBED TOTAL DOSE: 1000 CGRAY
RAD ONC MSQ PRESCRIPTION PATTERN COMMENT: NORMAL
RAD ONC MSQ START DATE: NORMAL
RAD ONC MSQ TREATMENT COURSE NUMBER: 1
RAD ONC MSQ TREATMENT SITE: NORMAL

## 2023-11-13 PROCEDURE — 77412 RADIATION TX DELIVERY LVL 3: CPT | Performed by: STUDENT IN AN ORGANIZED HEALTH CARE EDUCATION/TRAINING PROGRAM

## 2023-11-13 PROCEDURE — 77014 CHG CT GUIDANCE RADIATION THERAPY FLDS PLACEMENT: CPT | Performed by: RADIOLOGY

## 2023-11-13 PROCEDURE — 77387 GUIDANCE FOR RADJ TX DLVR: CPT | Performed by: RADIOLOGY

## 2023-11-16 ENCOUNTER — LAB (OUTPATIENT)
Dept: LAB | Facility: LAB | Age: 53
End: 2023-11-16
Payer: COMMERCIAL

## 2023-11-16 DIAGNOSIS — E11.9 TYPE 2 DIABETES MELLITUS WITHOUT COMPLICATION, WITHOUT LONG-TERM CURRENT USE OF INSULIN (MULTI): ICD-10-CM

## 2023-11-16 LAB
ALBUMIN SERPL BCP-MCNC: 4.5 G/DL (ref 3.4–5)
ALP SERPL-CCNC: 78 U/L (ref 33–110)
ALT SERPL W P-5'-P-CCNC: 14 U/L (ref 7–45)
ANION GAP SERPL CALC-SCNC: 14 MMOL/L (ref 10–20)
AST SERPL W P-5'-P-CCNC: 14 U/L (ref 9–39)
BASOPHILS # BLD AUTO: 0.06 X10*3/UL (ref 0–0.1)
BASOPHILS NFR BLD AUTO: 0.9 %
BILIRUB SERPL-MCNC: 0.4 MG/DL (ref 0–1.2)
BUN SERPL-MCNC: 8 MG/DL (ref 6–23)
CALCIUM SERPL-MCNC: 9.4 MG/DL (ref 8.6–10.3)
CHLORIDE SERPL-SCNC: 103 MMOL/L (ref 98–107)
CHOLEST SERPL-MCNC: 213 MG/DL (ref 0–199)
CHOLESTEROL/HDL RATIO: 4.7
CO2 SERPL-SCNC: 26 MMOL/L (ref 21–32)
CREAT SERPL-MCNC: 0.49 MG/DL (ref 0.5–1.05)
EOSINOPHIL # BLD AUTO: 0.22 X10*3/UL (ref 0–0.7)
EOSINOPHIL NFR BLD AUTO: 3.2 %
ERYTHROCYTE [DISTWIDTH] IN BLOOD BY AUTOMATED COUNT: 13.9 % (ref 11.5–14.5)
EST. AVERAGE GLUCOSE BLD GHB EST-MCNC: 126 MG/DL
GFR SERPL CREATININE-BSD FRML MDRD: >90 ML/MIN/1.73M*2
GLUCOSE SERPL-MCNC: 88 MG/DL (ref 74–99)
HBA1C MFR BLD: 6 %
HCT VFR BLD AUTO: 43.1 % (ref 36–46)
HDLC SERPL-MCNC: 44.9 MG/DL
HGB BLD-MCNC: 14 G/DL (ref 12–16)
IMM GRANULOCYTES # BLD AUTO: 0.03 X10*3/UL (ref 0–0.7)
IMM GRANULOCYTES NFR BLD AUTO: 0.4 % (ref 0–0.9)
LDLC SERPL CALC-MCNC: 151 MG/DL
LYMPHOCYTES # BLD AUTO: 2.11 X10*3/UL (ref 1.2–4.8)
LYMPHOCYTES NFR BLD AUTO: 30.2 %
MCH RBC QN AUTO: 27.5 PG (ref 26–34)
MCHC RBC AUTO-ENTMCNC: 32.5 G/DL (ref 32–36)
MCV RBC AUTO: 85 FL (ref 80–100)
MONOCYTES # BLD AUTO: 0.47 X10*3/UL (ref 0.1–1)
MONOCYTES NFR BLD AUTO: 6.7 %
NEUTROPHILS # BLD AUTO: 4.09 X10*3/UL (ref 1.2–7.7)
NEUTROPHILS NFR BLD AUTO: 58.6 %
NON HDL CHOLESTEROL: 168 MG/DL (ref 0–149)
NRBC BLD-RTO: 0 /100 WBCS (ref 0–0)
PLATELET # BLD AUTO: 281 X10*3/UL (ref 150–450)
POTASSIUM SERPL-SCNC: 4.7 MMOL/L (ref 3.5–5.3)
PROT SERPL-MCNC: 6.7 G/DL (ref 6.4–8.2)
RBC # BLD AUTO: 5.1 X10*6/UL (ref 4–5.2)
SODIUM SERPL-SCNC: 138 MMOL/L (ref 136–145)
TRIGL SERPL-MCNC: 84 MG/DL (ref 0–149)
VIT B12 SERPL-MCNC: 569 PG/ML (ref 211–911)
VLDL: 17 MG/DL (ref 0–40)
WBC # BLD AUTO: 7 X10*3/UL (ref 4.4–11.3)

## 2023-11-16 PROCEDURE — 82607 VITAMIN B-12: CPT

## 2023-11-16 PROCEDURE — 36415 COLL VENOUS BLD VENIPUNCTURE: CPT

## 2023-11-16 PROCEDURE — 85025 COMPLETE CBC W/AUTO DIFF WBC: CPT

## 2023-11-16 PROCEDURE — 83036 HEMOGLOBIN GLYCOSYLATED A1C: CPT

## 2023-11-16 PROCEDURE — 80053 COMPREHEN METABOLIC PANEL: CPT

## 2023-11-16 PROCEDURE — 80061 LIPID PANEL: CPT

## 2023-11-20 ENCOUNTER — OFFICE VISIT (OUTPATIENT)
Dept: PRIMARY CARE | Facility: CLINIC | Age: 53
End: 2023-11-20
Payer: COMMERCIAL

## 2023-11-20 ENCOUNTER — PHARMACY VISIT (OUTPATIENT)
Dept: PHARMACY | Facility: CLINIC | Age: 53
End: 2023-11-20
Payer: COMMERCIAL

## 2023-11-20 VITALS
HEART RATE: 87 BPM | WEIGHT: 223 LBS | BODY MASS INDEX: 35 KG/M2 | SYSTOLIC BLOOD PRESSURE: 110 MMHG | TEMPERATURE: 97.1 F | HEIGHT: 67 IN | OXYGEN SATURATION: 99 % | DIASTOLIC BLOOD PRESSURE: 87 MMHG

## 2023-11-20 DIAGNOSIS — D05.12 NEOPLASM OF LEFT BREAST, PRIMARY TUMOR STAGING CATEGORY TIS: DUCTAL CARCINOMA IN SITU (DCIS): ICD-10-CM

## 2023-11-20 DIAGNOSIS — E11.9 TYPE 2 DIABETES MELLITUS WITHOUT COMPLICATION, WITHOUT LONG-TERM CURRENT USE OF INSULIN (MULTI): Primary | ICD-10-CM

## 2023-11-20 DIAGNOSIS — E78.2 MIXED HYPERLIPIDEMIA: ICD-10-CM

## 2023-11-20 PROBLEM — F41.9 ANXIETY: Status: RESOLVED | Noted: 2023-05-11 | Resolved: 2023-11-20

## 2023-11-20 PROBLEM — L02.01 FACIAL ABSCESS: Status: RESOLVED | Noted: 2023-10-13 | Resolved: 2023-11-20

## 2023-11-20 PROCEDURE — 3074F SYST BP LT 130 MM HG: CPT | Performed by: FAMILY MEDICINE

## 2023-11-20 PROCEDURE — 1036F TOBACCO NON-USER: CPT | Performed by: FAMILY MEDICINE

## 2023-11-20 PROCEDURE — 3044F HG A1C LEVEL LT 7.0%: CPT | Performed by: FAMILY MEDICINE

## 2023-11-20 PROCEDURE — 99214 OFFICE O/P EST MOD 30 MIN: CPT | Performed by: FAMILY MEDICINE

## 2023-11-20 PROCEDURE — 3050F LDL-C >= 130 MG/DL: CPT | Performed by: FAMILY MEDICINE

## 2023-11-20 PROCEDURE — RXMED WILLOW AMBULATORY MEDICATION CHARGE

## 2023-11-20 PROCEDURE — 3079F DIAST BP 80-89 MM HG: CPT | Performed by: FAMILY MEDICINE

## 2023-11-20 RX ORDER — EZETIMIBE 10 MG/1
10 TABLET ORAL DAILY
Qty: 30 TABLET | Refills: 5 | Status: SHIPPED | OUTPATIENT
Start: 2023-11-20 | End: 2024-06-06 | Stop reason: SDUPTHER

## 2023-11-20 ASSESSMENT — PATIENT HEALTH QUESTIONNAIRE - PHQ9
2. FEELING DOWN, DEPRESSED OR HOPELESS: NOT AT ALL
SUM OF ALL RESPONSES TO PHQ9 QUESTIONS 1 AND 2: 0
1. LITTLE INTEREST OR PLEASURE IN DOING THINGS: NOT AT ALL

## 2023-11-20 NOTE — PROGRESS NOTES
Subjective   Patient ID: Ivelisse Pickens is a 52 y.o. female who presents for follow-up on diabetes and chronic medical conditions    HPI  Reviewed chronic medical conditions and labs  Review of Systems    Objective   Physical Exam  General: Patient is alert and oriented ×3 and appears in no acute distress. No respiratory distress.    Head: Atraumatic normocephalic.    Eyes: EOMI, PERRLA      Ears: Canals patent without any irritation, tympanic membranes without inflammation, no swelling, normal light reflex.    Mouth: Normal mucosa. Moist. No erythema, exudates, tonsillar enlargement.    Neck: Normal range of motion, no masses.  Thyroid is palpable and normal in size without any nodules. No anterior cervical or posterior cervical adenopathy.    Heart: Regular rate and rhythm, no murmurs clicks or gallops    Lungs: Clear to auscultation bilaterally without any rhonchi rales or wheezing, lung sounds heard throughout all lung fields    Musculoskeletal: Normal range of motion, strength is grossly intact in the proximal distal muscles of the upper and lower extremities bilaterally, deep tendon reflexes +2 out of 4 and symmetric bilaterally at the patella, Achilles, biceps, triceps, sensation intact.    Nerves: Cranial nerves II through XII appear grossly intact and without deficit    Skin: Intact, dry, no rashes or erythema    Psych: Normal affect.  Assessment/Plan   Problem List Items Addressed This Visit       Type 2 diabetes mellitus without complication, without long-term current use of insulin (CMS/[facility]) - Primary    Relevant Medications    dulaglutide 3 mg/0.5 mL pen injector    Mixed hyperlipidemia    Relevant Medications    ezetimibe (Zetia) 10 mg tablet     Other Visit Diagnoses       Neoplasm of left breast, primary tumor staging category Tis: ductal carcinoma in situ (DCIS)             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   Finished up with radiation    Diabetes mellitus type 2  - Hemoglobin A1c was controlled at 6.0 on November 16, 2023  - Not on a statin and LDL is uncontrolled.  Could not tolerate statins so we started Zetia  - Microalbumin has been negative  - Follow-up with eye doctor regularly  - Denies any cut scrapes or sores on the feet  - Currently on Trulicity 3 mg weekly and Jardiance 10 mg daily    Hyperlipidemia  - Started Zetia    Follow-up in 6 months or as needed

## 2023-11-28 ENCOUNTER — OFFICE VISIT (OUTPATIENT)
Dept: HEMATOLOGY/ONCOLOGY | Facility: CLINIC | Age: 53
End: 2023-11-28
Payer: COMMERCIAL

## 2023-11-28 VITALS
DIASTOLIC BLOOD PRESSURE: 84 MMHG | OXYGEN SATURATION: 92 % | BODY MASS INDEX: 35.59 KG/M2 | RESPIRATION RATE: 16 BRPM | SYSTOLIC BLOOD PRESSURE: 121 MMHG | TEMPERATURE: 98.1 F | HEART RATE: 95 BPM | HEIGHT: 67 IN | WEIGHT: 226.74 LBS

## 2023-11-28 DIAGNOSIS — D05.12 DUCTAL CARCINOMA IN SITU (DCIS) OF LEFT BREAST: ICD-10-CM

## 2023-11-28 PROCEDURE — 1036F TOBACCO NON-USER: CPT | Performed by: INTERNAL MEDICINE

## 2023-11-28 PROCEDURE — 3074F SYST BP LT 130 MM HG: CPT | Performed by: INTERNAL MEDICINE

## 2023-11-28 PROCEDURE — 99214 OFFICE O/P EST MOD 30 MIN: CPT | Performed by: INTERNAL MEDICINE

## 2023-11-28 PROCEDURE — 3079F DIAST BP 80-89 MM HG: CPT | Performed by: INTERNAL MEDICINE

## 2023-11-28 PROCEDURE — 3050F LDL-C >= 130 MG/DL: CPT | Performed by: INTERNAL MEDICINE

## 2023-11-28 PROCEDURE — 3044F HG A1C LEVEL LT 7.0%: CPT | Performed by: INTERNAL MEDICINE

## 2023-11-28 RX ORDER — TAMOXIFEN CITRATE 20 MG/1
20 TABLET ORAL DAILY
OUTPATIENT
Start: 2023-11-28

## 2023-11-28 ASSESSMENT — PAIN SCALES - GENERAL: PAINLEVEL: 0-NO PAIN

## 2023-11-28 NOTE — PROGRESS NOTES
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 daily started on November 28, 2023    Interval History:    11/28/23  Patient has a history of DCIS as of left breast status post resection and radiotherapy.  Post radiotherapy patient doing very well no any other complaint.          Referred by Dr. Howard      Screening mammogram on 9/23/22 showed developing calcifications and architectural alteration in the left breast UOQ (BI-RADS 0). Diagnostic  mammogram and ultrasound on 10/18/22 was read as calcifications with benign morphology (BI-RADS 3). Repeat diagnostic mammogram on 4/11/23 showed subtle increase in the number of calcifications that was suspicious (BI-RADS 4).     Biopsy of the calcifications on 4/19/23 showed intermediate grade DCIS, solid and cribriform pattern with calcifications, ER>95%.     Breast MRI on 6/2/23 showed non-mass enhancement in the right breast and minimal left breast biopsy changes with no additional sites of disease seen. There was no axillary or internal mammary adenopathy. MRI guided biopsy of the right breast on 7/19/23  showed perilobular hemangioma.     On 8/23/23, Dr. Howard took her to the OR for left lumpectomy/SLNB. Pathology showed DCIS measuring up to 1.2 cm, grade 2, with negative margins (>2 mm). One sentinel node was negative.   Medical oncology consultation was requested because of preventive therapy.  Patient is already deceiving go radiotherapy which will finish on normal testing 2023.       ROS  No headache and dizziness, no hot flash, no fever, no chest pain and shortness of breath, no nausea vomiting, no abdominal pain, no diarrhea and constipation.  Status post hysterectomy, patient still have ovaries.    Patient is active working full-time    Past Medical History:   Diagnosis Date    Personal history  "of other medical treatment     History of screening mammography      Past Surgical History:   Procedure Laterality Date    BI MAMMO GUIDED LOCALIZATION BREAST LEFT Left 8/21/2023    BI MAMMO GUIDED LOCALIZATION BREAST LEFT 8/21/2023 POR [id number]    OTHER SURGICAL HISTORY  07/11/2019    Cholecystectomy    OTHER SURGICAL HISTORY  07/11/2019    Hysterectomy    OTHER SURGICAL HISTORY  07/11/2019    Tonsillectomy        No family history on file.   Social History     Tobacco Use   Smoking Status Never   Smokeless Tobacco Never      Current Outpatient Medications:     aspirin 81 mg EC tablet, once daily., Disp: , Rfl:     cholecalciferol (Vitamin D-3) 25 MCG (1000 UT) capsule, once daily., Disp: , Rfl:     dextromethorphan-guaifenesin (Mucinex DM)  mg 12 hr tablet, Take 1 tablet by mouth every 12 hours., Disp: , Rfl:     diazePAM (Valium) 5 mg tablet, Take 1 tablet (5 mg) by mouth 1 time for 1 dose., Disp: 2 tablet, Rfl: 0    dulaglutide 3 mg/0.5 mL pen injector, Inject 3 mg under the skin 1 (one) time per week., Disp: 2 mL, Rfl: 11    empagliflozin (Jardiance) 10 mg, TAKE 1 TABLET BY MOUTH ONCE DAILY, Disp: 90 tablet, Rfl: 3    ezetimibe (Zetia) 10 mg tablet, Take 1 tablet (10 mg) by mouth once daily., Disp: 30 tablet, Rfl: 5    fexofenadine (Allegra) 180 mg tablet, Take 1 tablet (180 mg) by mouth once daily., Disp: , Rfl:     fluticasone (Flonase) 50 mcg/actuation nasal spray, Administer 1 spray into each nostril once daily., Disp: , Rfl:     Jardiance 10 mg, Take 1 tablet (10 mg) by mouth once daily., Disp: , Rfl:     omeprazole (PriLOSEC) 20 mg DR capsule, once daily., Disp: , Rfl:     Vitamin D2 1,250 mcg (50,000 unit) capsule, Take 1 capsule (50,000 Units) by mouth 1 (one) time per week., Disp: , Rfl:       Physical Exam  Last Recorded Vitals  Blood pressure 121/84, pulse 95, temperature 36.7 °C (98.1 °F), resp. rate 16, height 1.698 m (5' 6.85\"), weight 103 kg (226 lb 11.9 oz), SpO2 92 %.     Relevant Results      "   Assessment/Plan   1.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     Patient is receiving adjuvant radiotherapy.  After completion of radiotherapy we will start tamoxifen 20 mg p.o. daily.  Possible side effect of tamoxifen including hot flashes, bony pain, risk of blood clot, discussed cervical cancer discussed with the patient in detail.    Tamoxifen will be started 2 weeks after completion of radiotherapy, I will also check a serum estrogen level and FSH, if the patient indeed is postmenopausal, will consider aromatase inhibitors.  Patient will be reevaluated after 11/13 /2023 May we will make a final decision.  Most probably I will start tamoxifen 20 mg p.o. daily.  11/28/23    #1 DCIS of left breast, s/p lumpectomy, status post radiotherapy.    We will offer tamoxifen 20 mg p.o. daily as preventive treatment for invasive carcinoma.  Possible side effect discussed with the patient.  Patient is in agreement start tamoxifen.    Follow-up after 3 months    Continue tamoxifen for 5 years    Mammogram on regular basis    Discussed with patient    Zoran Hodge MD

## 2023-11-29 RX ORDER — TAMOXIFEN CITRATE 20 MG/1
20 TABLET ORAL ONCE
OUTPATIENT
Start: 2023-11-29 | End: 2023-11-29

## 2023-12-01 RX ORDER — TAMOXIFEN CITRATE 20 MG/1
20 TABLET ORAL DAILY
OUTPATIENT
Start: 2023-12-01

## 2023-12-01 RX ORDER — TAMOXIFEN CITRATE 20 MG/1
20 TABLET ORAL DAILY
OUTPATIENT
Start: 2023-12-02

## 2023-12-04 ENCOUNTER — TELEPHONE (OUTPATIENT)
Dept: HEMATOLOGY/ONCOLOGY | Facility: CLINIC | Age: 53
End: 2023-12-04
Payer: COMMERCIAL

## 2023-12-04 ENCOUNTER — PHARMACY VISIT (OUTPATIENT)
Dept: PHARMACY | Facility: CLINIC | Age: 53
End: 2023-12-04
Payer: COMMERCIAL

## 2023-12-04 DIAGNOSIS — D05.12 DUCTAL CARCINOMA IN SITU (DCIS) OF LEFT BREAST: Primary | ICD-10-CM

## 2023-12-04 DIAGNOSIS — D05.10 DUCTAL CARCINOMA IN SITU (DCIS) OF BREAST, UNSPECIFIED LATERALITY: Primary | ICD-10-CM

## 2023-12-04 PROCEDURE — RXMED WILLOW AMBULATORY MEDICATION CHARGE

## 2023-12-04 RX ORDER — TAMOXIFEN CITRATE 20 MG/1
20 TABLET ORAL DAILY
Qty: 90 TABLET | Refills: 3 | Status: SHIPPED | OUTPATIENT
Start: 2023-12-04 | End: 2024-12-03

## 2023-12-04 RX ORDER — TAMOXIFEN CITRATE 20 MG/1
20 TABLET ORAL ONCE
OUTPATIENT
Start: 2023-12-04

## 2023-12-04 RX ORDER — TAMOXIFEN CITRATE 20 MG/1
20 TABLET ORAL ONCE
OUTPATIENT
Start: 2023-12-04 | End: 2023-12-04

## 2023-12-04 NOTE — TELEPHONE ENCOUNTER
Pt calling as pharmacy has not yet received prescription for tamoxifen as discussed at [facility] on 11/28/2023. Message fwd to provider for submission to pharmacy.

## 2023-12-12 NOTE — PROGRESS NOTES
Radiation Oncology Treatment Summary    Patient Name:  Ivelisse Pickens  MRN:  [id number]  :  1970    Referring Provider: No ref. provider found  Primary Care Provider: Chidi Blood DO    Brief History: Ivelisse Pickens is a 53 y.o. female with No matching staging information was found for the patient..  The patient completed radiotherapy as outlined below.    Radiation Treatment Summary:    Radiation Treatments       Active   Lt Breast (Started on 10/17/2023)   Most recent fraction: 266 cGy given on 2023   Total given: 3,990 cGy / 4,256 cGy  (15 of 16 fractions)   Elapsed Days: 20   Technique: 3D                   Concurrent Chemotherapy:  Treatment Plans       No treatment plans exist          CTCAE Toxicity Overview:     Patient Disposition: Patient tolerated treatment well. She developed erythema to the treatment area, but did not have any desquamation or swelling. She will be seen for follow up on 2023. She knows to call the office with any questions or concerns.

## 2023-12-13 PROCEDURE — RXMED WILLOW AMBULATORY MEDICATION CHARGE

## 2023-12-14 ENCOUNTER — PHARMACY VISIT (OUTPATIENT)
Dept: PHARMACY | Facility: CLINIC | Age: 53
End: 2023-12-14
Payer: COMMERCIAL

## 2023-12-27 ENCOUNTER — HOSPITAL ENCOUNTER (OUTPATIENT)
Dept: RADIATION ONCOLOGY | Facility: CLINIC | Age: 53
Setting detail: RADIATION/ONCOLOGY SERIES
Discharge: HOME | End: 2023-12-27
Payer: COMMERCIAL

## 2023-12-27 VITALS
OXYGEN SATURATION: 96 % | WEIGHT: 221 LBS | SYSTOLIC BLOOD PRESSURE: 123 MMHG | BODY MASS INDEX: 34.77 KG/M2 | RESPIRATION RATE: 18 BRPM | DIASTOLIC BLOOD PRESSURE: 87 MMHG | HEART RATE: 90 BPM | TEMPERATURE: 97.2 F

## 2023-12-27 DIAGNOSIS — D05.12 DUCTAL CARCINOMA IN SITU (DCIS) OF LEFT BREAST: Primary | ICD-10-CM

## 2023-12-27 PROCEDURE — 99214 OFFICE O/P EST MOD 30 MIN: CPT | Performed by: STUDENT IN AN ORGANIZED HEALTH CARE EDUCATION/TRAINING PROGRAM

## 2023-12-27 ASSESSMENT — PATIENT HEALTH QUESTIONNAIRE - PHQ9
2. FEELING DOWN, DEPRESSED OR HOPELESS: NOT AT ALL
1. LITTLE INTEREST OR PLEASURE IN DOING THINGS: NOT AT ALL
SUM OF ALL RESPONSES TO PHQ9 QUESTIONS 1 & 2: 0

## 2023-12-27 ASSESSMENT — COLUMBIA-SUICIDE SEVERITY RATING SCALE - C-SSRS
1. IN THE PAST MONTH, HAVE YOU WISHED YOU WERE DEAD OR WISHED YOU COULD GO TO SLEEP AND NOT WAKE UP?: NO
2. HAVE YOU ACTUALLY HAD ANY THOUGHTS OF KILLING YOURSELF?: NO
6. HAVE YOU EVER DONE ANYTHING, STARTED TO DO ANYTHING, OR PREPARED TO DO ANYTHING TO END YOUR LIFE?: NO

## 2023-12-27 ASSESSMENT — PAIN SCALES - GENERAL: PAINLEVEL: 0-NO PAIN

## 2023-12-27 ASSESSMENT — ENCOUNTER SYMPTOMS
OCCASIONAL FEELINGS OF UNSTEADINESS: 0
LOSS OF SENSATION IN FEET: 0
DEPRESSION: 0

## 2023-12-27 NOTE — PROGRESS NOTES
Radiation Oncology Follow-Up    Patient Name:  Ivelisse Pickens  MRN:  [id number]  :  1970    Referring Provider: No ref. provider found  Primary Care Provider: Chidi Blood DO  Care Team: Patient Care Team:  Chidi Blood DO as PCP - General  Chidi Blood DO as PCP - Employee ACO PCP  Zoran Hodge MD as Consulting Physician (Hematology and Oncology)    Date of Service: 2023     SUBJECTIVE  History of Present Illness:   Ivelisse Pickens is a 53 y.o. female who was previously seen at the [facility] Department of Radiation Oncology for left breast DCIS (1.2 cm, intermediate grade, ER+) s/p left lumpectomy/SLNB on 23 with widely negative margins. She received postop left breast radiation 42.56 Gy/16 fractions followed by tumor bed boost 10 Gy/4 fractions completed on 23.    At the end of the radiation course she had left breast redness and irritation but no peeling, and this has all resolved. She denies pain or swelling in the left breast. She started taking tamoxifen at the end of 2023, and she is tolerating it well. She did notice some headaches at first but these have  resolved. She denies hot flashes or fatigue. She continues to work full time as usual.    Treatment Rendered:   Radiation Treatments       Active   Lt Breast (Started on 10/17/2023)   Most recent fraction: 266 cGy given on 2023   Total given: 4,256 cGy / 4,256 cGy  (16 of 16 fractions)   Elapsed Days: 21   Technique: 3D           Completed   Tumor Bed Boost (Started on 2023)   Most recent fraction: 250 cGy given on 2023   Total given: 1,000 cGy / 1,000 cGy  (4 of 4 fractions)   Elapsed Days: 5   Technique: 3D                     Review of Systems:   Review of Systems   All other systems reviewed and are negative.      Performance Status:   The Karnofsky performance scale today is 100, Fully active, able to carry on all pre-disease performed without restriction (ECOG  equivalent 0).        OBJECTIVE  Vital Signs:  /87 (BP Location: Left arm, Patient Position: Sitting, BP Cuff Size: Large adult long)   Pulse 90   Temp 36.2 °C (97.2 °F) (Temporal)   Resp 18   Wt 100 kg (221 lb)   SpO2 96%   BMI 34.77 kg/m²    Physical Exam:  Physical Exam  Vitals reviewed.   Constitutional:       General: She is not in acute distress.     Appearance: Normal appearance.   HENT:      Head: Normocephalic and atraumatic.      Mouth/Throat:      Mouth: Mucous membranes are moist.      Pharynx: Oropharynx is clear. No oropharyngeal exudate or posterior oropharyngeal erythema.   Eyes:      General: No scleral icterus.     Extraocular Movements: Extraocular movements intact.      Conjunctiva/sclera: Conjunctivae normal.      Pupils: Pupils are equal, round, and reactive to light.   Pulmonary:      Effort: Pulmonary effort is normal. No respiratory distress.   Chest:   Breasts:     Right: Normal. No swelling, bleeding, inverted nipple, mass, nipple discharge, skin change or tenderness.      Left: No swelling, bleeding, inverted nipple, mass, nipple discharge, skin change or tenderness.      Comments: Left lumpectomy incision well-healed and intact. No suspicious breast masses, skin changes, or nipple discharge.  Musculoskeletal:         General: Normal range of motion.      Cervical back: Normal range of motion and neck supple.      Right lower leg: No edema.      Left lower leg: No edema.   Lymphadenopathy:      Upper Body:      Right upper body: No supraclavicular, axillary or pectoral adenopathy.      Left upper body: No supraclavicular, axillary or pectoral adenopathy.   Skin:     General: Skin is warm and dry.      Coloration: Skin is not jaundiced.      Findings: No lesion or rash.   Neurological:      General: No focal deficit present.      Mental Status: She is alert and oriented to person, place, and time.      Cranial Nerves: No cranial nerve deficit.      Sensory: No sensory deficit.       Motor: No weakness.      Coordination: Coordination normal.      Gait: Gait normal.   Psychiatric:         Mood and Affect: Mood normal.         Behavior: Behavior normal.             ASSESSMENT:  Ivelisse Pickens is a 53 y.o. 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 She received postop left breast radiation 42.56 Gy/16 fractions followed by tumor bed boost 10 Gy/4 fractions completed on 11/13/23.    She has recovered from her acute radiation side effects and is tolerating endocrine therapy. She maintains excellent performance status (ECOG 0). She has no evidence of disease on exam today.    She will follow up with Dr. Howard in 1/2024 and with Dr. Hodge in 2/2024. I will see her in 5/2024 for follow up.    NCCN Guidelines were applicable to guide this patients treatment plan.    Satya Boogie MD

## 2023-12-27 NOTE — ADDENDUM NOTE
Encounter addended by: Satya HOPSON MD on: 12/27/2023 4:15 PM   Actions taken: Visit diagnoses modified

## 2024-01-26 ENCOUNTER — PHARMACY VISIT (OUTPATIENT)
Dept: PHARMACY | Facility: CLINIC | Age: 54
End: 2024-01-26

## 2024-01-26 PROCEDURE — RXOTC WILLOW AMBULATORY OTC CHARGE

## 2024-02-01 DIAGNOSIS — E11.9 TYPE 2 DIABETES MELLITUS WITHOUT COMPLICATION, WITHOUT LONG-TERM CURRENT USE OF INSULIN (MULTI): Primary | ICD-10-CM

## 2024-02-01 PROCEDURE — RXMED WILLOW AMBULATORY MEDICATION CHARGE

## 2024-02-05 ENCOUNTER — PHARMACY VISIT (OUTPATIENT)
Dept: PHARMACY | Facility: CLINIC | Age: 54
End: 2024-02-05
Payer: COMMERCIAL

## 2024-02-05 PROCEDURE — RXMED WILLOW AMBULATORY MEDICATION CHARGE

## 2024-02-25 PROCEDURE — RXMED WILLOW AMBULATORY MEDICATION CHARGE

## 2024-02-28 ENCOUNTER — APPOINTMENT (OUTPATIENT)
Dept: HEMATOLOGY/ONCOLOGY | Facility: CLINIC | Age: 54
End: 2024-02-28
Payer: COMMERCIAL

## 2024-02-29 ENCOUNTER — PHARMACY VISIT (OUTPATIENT)
Dept: PHARMACY | Facility: CLINIC | Age: 54
End: 2024-02-29
Payer: COMMERCIAL

## 2024-03-13 ENCOUNTER — APPOINTMENT (OUTPATIENT)
Dept: PHARMACY | Facility: HOSPITAL | Age: 54
End: 2024-03-13
Payer: COMMERCIAL

## 2024-03-13 ENCOUNTER — TELEMEDICINE (OUTPATIENT)
Dept: PHARMACY | Facility: HOSPITAL | Age: 54
End: 2024-03-13
Payer: COMMERCIAL

## 2024-03-13 DIAGNOSIS — E11.9 TYPE 2 DIABETES MELLITUS WITHOUT COMPLICATION, WITHOUT LONG-TERM CURRENT USE OF INSULIN (MULTI): Primary | ICD-10-CM

## 2024-03-13 PROCEDURE — RXMED WILLOW AMBULATORY MEDICATION CHARGE

## 2024-03-13 RX ORDER — ISOPROPYL ALCOHOL 70 ML/100ML
SWAB TOPICAL
Qty: 100 EACH | Refills: 3 | Status: SHIPPED | OUTPATIENT
Start: 2024-03-13

## 2024-03-13 RX ORDER — BLOOD SUGAR DIAGNOSTIC
STRIP MISCELLANEOUS
Qty: 100 EACH | Refills: 3 | Status: SHIPPED | OUTPATIENT
Start: 2024-03-13

## 2024-03-13 RX ORDER — DEXTROSE 4 G
TABLET,CHEWABLE ORAL
Qty: 1 EACH | Refills: 0 | Status: SHIPPED | OUTPATIENT
Start: 2024-03-13

## 2024-03-13 RX ORDER — LANCETS 26 GAUGE
EACH MISCELLANEOUS
Qty: 1 EACH | Refills: 0 | Status: SHIPPED | OUTPATIENT
Start: 2024-03-13 | End: 2025-03-13

## 2024-03-13 RX ORDER — LANCETS
EACH MISCELLANEOUS
Qty: 102 EACH | Refills: 3 | Status: SHIPPED | OUTPATIENT
Start: 2024-03-13

## 2024-03-13 NOTE — PROGRESS NOTES
[facility] ([facility])    Ivelisse Pickens is a 53 y.o. female was referred to Clinical Pharmacy Team to complete a comprehensive medication review (CMR) with a pharmacist as part of the [employer] diabetes program.      Referring Provider: Chidi Blood, DO    Subjective   Allergies   Allergen Reactions    Atorvastatin Unknown       [facility]  [address] [facility] [address]  Phone: [phone] Fax: [phone]    DIABETES MELLITUS TYPE 2:    Known diabetic complications: None  Last optometry exam: January 2024  Most recent visit in Podiatry: None  Patient has 0 sores or cuts on feet today      Current diabetic medications include:  Jardiance 10mg  Trulicity 3mg    Adherence to diabetic medications: 0 missed doses in the last week    Patient does not test BG at home    Any episodes of hypoglycemia? No.      Secondary Prevention  Statin? No - has allergy to Atorvastatin  ACE-I/ARB? No  Aspirin? Yes    Pertinent PMH Review  PMH of Pancreatitis: No  PMH of Retinopathy: No  PMH of Urinary Tract Infections: No  PMH of MTC: No    Lifestyle  Diet:   Mindful eating with fruits/veggies & protein - cuts carbs/starches  Exercise:   Job has her moving on her feet - plans to start exercising with warmer weather  Alcohol Consumption? No  Smoking/Tobacco Use? No    Objective     There were no vitals taken for this visit.     LAB  Lab Results   Component Value Date    BILITOT 0.4 11/16/2023    CALCIUM 9.4 11/16/2023    CO2 26 11/16/2023     11/16/2023    CREATININE 0.49 (L) 11/16/2023    GLUCOSE 88 11/16/2023    ALKPHOS 78 11/16/2023    K 4.7 11/16/2023    PROT 6.7 11/16/2023     11/16/2023    AST 14 11/16/2023    ALT 14 11/16/2023    BUN 8 11/16/2023    ANIONGAP 14 11/16/2023    MG 2.02 04/01/2022    ALBUMIN 4.5 11/16/2023    GFRF 80 08/11/2023     Lab Results   Component Value Date    TRIG 84 11/16/2023    CHOL 213 (H) 11/16/2023     LDLCALC 151 (H) 11/16/2023    HDL 44.9 11/16/2023     Lab Results   Component Value Date    HGBA1C 6.0 (H) 11/16/2023     Current Outpatient Medications   Medication Instructions    alcohol swabs pads, medicated Use to test blood sugars 2-3 times a week as directed    aspirin 81 mg, oral, Daily    Autolet (Accu-Chek FastClix Lancing Dev) lancing device Use with lancets to test blood sugars 2-3 times a week as directed    blood sugar diagnostic (Accu-Chek Guide test strips) strip Use to test blood sugars 2-3 times a week as directed    blood-glucose meter (Accu-Chek Guide Glucose Meter) misc Use to test blood sugars 2-3 times a week as directed    cholecalciferol (VITAMIN D-3) 25 mcg, oral, Daily    dextromethorphan-guaifenesin (Mucinex DM)  mg 12 hr tablet 1 tablet, oral, Every 12 hours PRN    empagliflozin (Jardiance) 10 mg TAKE 1 TABLET BY MOUTH ONCE DAILY    ezetimibe (ZETIA) 10 mg, oral, Daily    fluticasone (Flonase) 50 mcg/actuation nasal spray 1 spray, Each Nostril, Daily PRN    lancets (Accu-Chek Fastclix Lancet Drum) misc Use with lancing device to test blood sugars 2-3 times a week as directed    omeprazole (PRILOSEC) 20 mg, oral, Daily RT    tamoxifen (NOLVADEX) 20 mg, oral, Daily, Take with water or any other nonalcoholic drink with or without food at around the same time(s) every day.    Trulicity 3 mg, subcutaneous, Weekly     HISTORICAL PHARMACOTHERAPY (MED+REASON FOR DC)  Diazepam - Therapy finished, needed for 1 time MRI  Allergra - Not taking  Vitamin D2 - Therapy finished    DRUG INTERACTIONS  No pertinent DDI at this time    Assessment of Diabetes Mellitus Control  Patient's Personal Diabetes Goals: Keep sugars at goal  Patient's goal A1c is < 7%  Is pt at goal? Yes  Current A1c: 6%  Patient does not check SMBGs  Any complications? No    Plan for this appointment  CONTINUE Jardiance and Trulicity  START sugar checks BIW-three times per week, meter, lancets, lancing device, strips to be sent  in  Diet/lifestyle - continue with low carb, high protein intake, veggie/fruit intake; start exercising 30 mins 3-5x a week     Employee Health Diabetes Program (VBID)  Patient enrolled in  Employee diabetes program for $0 co-pays on diabetes medications/supplies. Enrollment should be active in 2-4 weeks.  Requested VBID enrollment date - BEFORE 4/13/24    Follow up: 10-11 months for VBID re-enrollment    Continue all meds under the continuation of care with the referring provider and clinical pharmacy team.    Angélica Ferrari PharmD     Verbal consent to manage patient's drug therapy was obtained from [the patient and/or an individual authorized to act on behalf of a patient]. They were informed they may decline to participate or withdraw from participation in pharmacy services at any time.

## 2024-03-14 NOTE — PROGRESS NOTES
I reviewed the progress note and agree with the resident’s findings and plans as written. Case discussed with resident.    Ashley Mendoza, PharmD

## 2024-03-19 ENCOUNTER — PHARMACY VISIT (OUTPATIENT)
Dept: PHARMACY | Facility: CLINIC | Age: 54
End: 2024-03-19
Payer: COMMERCIAL

## 2024-03-25 ENCOUNTER — OFFICE VISIT (OUTPATIENT)
Dept: HEMATOLOGY/ONCOLOGY | Facility: CLINIC | Age: 54
End: 2024-03-25
Payer: COMMERCIAL

## 2024-03-25 VITALS
TEMPERATURE: 98.4 F | BODY MASS INDEX: 34.36 KG/M2 | WEIGHT: 218.92 LBS | HEIGHT: 67 IN | OXYGEN SATURATION: 97 % | RESPIRATION RATE: 16 BRPM | HEART RATE: 93 BPM | DIASTOLIC BLOOD PRESSURE: 95 MMHG | SYSTOLIC BLOOD PRESSURE: 140 MMHG

## 2024-03-25 DIAGNOSIS — D05.12 DUCTAL CARCINOMA IN SITU (DCIS) OF LEFT BREAST: ICD-10-CM

## 2024-03-25 LAB
ALBUMIN SERPL BCP-MCNC: 4.3 G/DL (ref 3.4–5)
ALP SERPL-CCNC: 46 U/L (ref 33–110)
ALT SERPL W P-5'-P-CCNC: 12 U/L (ref 7–45)
ANION GAP SERPL CALC-SCNC: 13 MMOL/L (ref 10–20)
AST SERPL W P-5'-P-CCNC: 14 U/L (ref 9–39)
BASOPHILS # BLD AUTO: 0.04 X10*3/UL (ref 0–0.1)
BASOPHILS NFR BLD AUTO: 0.7 %
BILIRUB SERPL-MCNC: 0.3 MG/DL (ref 0–1.2)
BUN SERPL-MCNC: 12 MG/DL (ref 6–23)
CALCIUM SERPL-MCNC: 9.1 MG/DL (ref 8.6–10.3)
CHLORIDE SERPL-SCNC: 106 MMOL/L (ref 98–107)
CO2 SERPL-SCNC: 24 MMOL/L (ref 21–32)
CREAT SERPL-MCNC: 0.68 MG/DL (ref 0.5–1.05)
EGFRCR SERPLBLD CKD-EPI 2021: >90 ML/MIN/1.73M*2
EOSINOPHIL # BLD AUTO: 0.28 X10*3/UL (ref 0–0.7)
EOSINOPHIL NFR BLD AUTO: 4.8 %
ERYTHROCYTE [DISTWIDTH] IN BLOOD BY AUTOMATED COUNT: 14.8 % (ref 11.5–14.5)
GLUCOSE SERPL-MCNC: 108 MG/DL (ref 74–99)
HCT VFR BLD AUTO: 39.4 % (ref 36–46)
HGB BLD-MCNC: 12.8 G/DL (ref 12–16)
IMM GRANULOCYTES # BLD AUTO: 0.01 X10*3/UL (ref 0–0.7)
IMM GRANULOCYTES NFR BLD AUTO: 0.2 % (ref 0–0.9)
LYMPHOCYTES # BLD AUTO: 2.08 X10*3/UL (ref 1.2–4.8)
LYMPHOCYTES NFR BLD AUTO: 35.7 %
MCH RBC QN AUTO: 27.6 PG (ref 26–34)
MCHC RBC AUTO-ENTMCNC: 32.5 G/DL (ref 32–36)
MCV RBC AUTO: 85 FL (ref 80–100)
MONOCYTES # BLD AUTO: 0.46 X10*3/UL (ref 0.1–1)
MONOCYTES NFR BLD AUTO: 7.9 %
NEUTROPHILS # BLD AUTO: 2.95 X10*3/UL (ref 1.2–7.7)
NEUTROPHILS NFR BLD AUTO: 50.7 %
PLATELET # BLD AUTO: 251 X10*3/UL (ref 150–450)
POTASSIUM SERPL-SCNC: 3.8 MMOL/L (ref 3.5–5.3)
PROT SERPL-MCNC: 6.8 G/DL (ref 6.4–8.2)
RBC # BLD AUTO: 4.64 X10*6/UL (ref 4–5.2)
SODIUM SERPL-SCNC: 139 MMOL/L (ref 136–145)
WBC # BLD AUTO: 5.8 X10*3/UL (ref 4.4–11.3)

## 2024-03-25 PROCEDURE — 99213 OFFICE O/P EST LOW 20 MIN: CPT | Performed by: INTERNAL MEDICINE

## 2024-03-25 PROCEDURE — 3080F DIAST BP >= 90 MM HG: CPT | Performed by: INTERNAL MEDICINE

## 2024-03-25 PROCEDURE — 1036F TOBACCO NON-USER: CPT | Performed by: INTERNAL MEDICINE

## 2024-03-25 PROCEDURE — 85025 COMPLETE CBC W/AUTO DIFF WBC: CPT | Performed by: INTERNAL MEDICINE

## 2024-03-25 PROCEDURE — 3077F SYST BP >= 140 MM HG: CPT | Performed by: INTERNAL MEDICINE

## 2024-03-25 PROCEDURE — 80053 COMPREHEN METABOLIC PANEL: CPT | Performed by: INTERNAL MEDICINE

## 2024-03-25 PROCEDURE — 36415 COLL VENOUS BLD VENIPUNCTURE: CPT | Performed by: INTERNAL MEDICINE

## 2024-03-25 ASSESSMENT — PAIN SCALES - GENERAL: PAINLEVEL: 0-NO PAIN

## 2024-03-25 NOTE — PATIENT INSTRUCTIONS
Office visit  with dr. Hodge for Left breast DCIS s/p radiation  Marlin will continue Tamoxifen  Follow up with dr. Hodge in 6mons

## 2024-03-25 NOTE — PROGRESS NOTES
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 daily started on November 28, 2023    Interval History:      3/25/24  Patient has a history of DCIS as of left breast status post resection and radiotherapy.  Post radiotherapy patient doing very well no any other complaint.          Referred by Dr. Howard      Screening mammogram on 9/23/22 showed developing calcifications and architectural alteration in the left breast UOQ (BI-RADS 0). Diagnostic  mammogram and ultrasound on 10/18/22 was read as calcifications with benign morphology (BI-RADS 3). Repeat diagnostic mammogram on 4/11/23 showed subtle increase in the number of calcifications that was suspicious (BI-RADS 4).     Biopsy of the calcifications on 4/19/23 showed intermediate grade DCIS, solid and cribriform pattern with calcifications, ER>95%.     Breast MRI on 6/2/23 showed non-mass enhancement in the right breast and minimal left breast biopsy changes with no additional sites of disease seen. There was no axillary or internal mammary adenopathy. MRI guided biopsy of the right breast on 7/19/23  showed perilobular hemangioma.     On 8/23/23, Dr. Howard took her to the OR for left lumpectomy/SLNB. Pathology showed DCIS measuring up to 1.2 cm, grade 2, with negative margins (>2 mm). One sentinel node was negative.   Medical oncology consultation was requested because of preventive therapy.  Patient is already deceiving go radiotherapy which will finish on normal testing 2023.       ROS  No headache and dizziness, no hot flash, no fever, no chest pain and shortness of breath, no nausea vomiting, no abdominal pain, no diarrhea and constipation.  Status post hysterectomy, patient still have ovaries.    Patient is active working full-time    Past Medical History:   Diagnosis Date    Personal history  of other medical treatment     History of screening mammography      Past Surgical History:   Procedure Laterality Date    BI MAMMO GUIDED LOCALIZATION BREAST LEFT Left 8/21/2023    BI MAMMO GUIDED LOCALIZATION BREAST LEFT 8/21/2023 POR MEENAKSHI    OTHER SURGICAL HISTORY  07/11/2019    Cholecystectomy    OTHER SURGICAL HISTORY  07/11/2019    Hysterectomy    OTHER SURGICAL HISTORY  07/11/2019    Tonsillectomy        No family history on file.   Social History     Tobacco Use   Smoking Status Never   Smokeless Tobacco Never      Current Outpatient Medications:     alcohol swabs pads, medicated, Use to test blood sugars 2-3 times a week as directed, Disp: 100 each, Rfl: 3    aspirin 81 mg EC tablet, Take 1 tablet (81 mg) by mouth once daily., Disp: , Rfl:     Autolet (Accu-Chek FastClix Lancing Dev) lancing device, Use with lancets to test blood sugars 2-3 times a week as directed, Disp: 1 each, Rfl: 0    blood sugar diagnostic (Accu-Chek Guide test strips) strip, Use to test blood sugars 2-3 times a week as directed, Disp: 100 each, Rfl: 3    blood-glucose meter (Accu-Chek Guide Glucose Meter) misc, Use to test blood sugars 2-3 times a week as directed, Disp: 1 each, Rfl: 0    cholecalciferol (Vitamin D-3) 25 MCG (1000 UT) capsule, Take 1 capsule (25 mcg) by mouth once daily., Disp: , Rfl:     dextromethorphan-guaifenesin (Mucinex DM)  mg 12 hr tablet, Take 1 tablet by mouth every 12 hours if needed for cough., Disp: , Rfl:     dulaglutide 3 mg/0.5 mL pen injector, Inject 3 mg under the skin 1 (one) time per week., Disp: 2 mL, Rfl: 11    empagliflozin (Jardiance) 10 mg, TAKE 1 TABLET BY MOUTH ONCE DAILY, Disp: 90 tablet, Rfl: 3    ezetimibe (Zetia) 10 mg tablet, Take 1 tablet (10 mg) by mouth once daily., Disp: 30 tablet, Rfl: 5    fluticasone (Flonase) 50 mcg/actuation nasal spray, Administer 1 spray into each nostril once daily as needed for allergies., Disp: , Rfl:     lancets (Accu-Chek Fastclix Lancet Drum)  "misc, Use with lancing device to test blood sugars 2-3 times a week as directed, Disp: 102 each, Rfl: 3    omeprazole (PriLOSEC) 20 mg DR capsule, Take 1 capsule (20 mg) by mouth once daily., Disp: , Rfl:     tamoxifen (Nolvadex) 20 mg tablet, Take 1 tablet (20 mg total) by mouth once daily.  Take with water or any other nonalcoholic drink with or without food at around the same time(s) every day., Disp: 90 tablet, Rfl: 3      Physical Exam  Last Recorded Vitals  Blood pressure (!) 140/95, pulse 93, temperature 36.9 °C (98.4 °F), resp. rate 16, height 1.698 m (5' 6.85\"), weight 99.3 kg (218 lb 14.7 oz), SpO2 97 %.     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     Patient is receiving adjuvant radiotherapy.  After completion of radiotherapy we will start tamoxifen 20 mg p.o. daily.  Possible side effect of tamoxifen including hot flashes, bony pain, risk of blood clot, discussed cervical cancer discussed with the patient in detail.    Tamoxifen will be started 2 weeks after completion of radiotherapy, I will also check a serum estrogen level and FSH, if the patient indeed is postmenopausal, will consider aromatase inhibitors.  Patient will be reevaluated after 11/13 /2023 May we will make a final decision.  Most probably I will start tamoxifen 20 mg p.o. daily.  3/25/24    #1 DCIS of left breast, s/p lumpectomy, status post radiotherapy.    We will offer tamoxifen 20 mg p.o. daily as preventive treatment for invasive carcinoma.  Possible side effect discussed with the patient.  Patient is in agreement start tamoxifen.    Follow-up after 3 months    Continue tamoxifen for 5 years    Mammogram on regular basis    Discussed with patient    Zoran Hodge MD  "

## 2024-03-31 PROCEDURE — RXMED WILLOW AMBULATORY MEDICATION CHARGE

## 2024-04-01 ENCOUNTER — PHARMACY VISIT (OUTPATIENT)
Dept: PHARMACY | Facility: CLINIC | Age: 54
End: 2024-04-01
Payer: COMMERCIAL

## 2024-04-01 PROCEDURE — RXOTC WILLOW AMBULATORY OTC CHARGE

## 2024-04-10 ENCOUNTER — OFFICE VISIT (OUTPATIENT)
Dept: OBSTETRICS AND GYNECOLOGY | Facility: CLINIC | Age: 54
End: 2024-04-10
Payer: COMMERCIAL

## 2024-04-10 VITALS
BODY MASS INDEX: 32.89 KG/M2 | HEIGHT: 68 IN | WEIGHT: 217 LBS | SYSTOLIC BLOOD PRESSURE: 138 MMHG | DIASTOLIC BLOOD PRESSURE: 72 MMHG

## 2024-04-10 DIAGNOSIS — Z01.419 WOMEN'S ANNUAL ROUTINE GYNECOLOGICAL EXAMINATION: Primary | ICD-10-CM

## 2024-04-10 PROCEDURE — 3075F SYST BP GE 130 - 139MM HG: CPT | Performed by: NURSE PRACTITIONER

## 2024-04-10 PROCEDURE — 1036F TOBACCO NON-USER: CPT | Performed by: NURSE PRACTITIONER

## 2024-04-10 PROCEDURE — 3078F DIAST BP <80 MM HG: CPT | Performed by: NURSE PRACTITIONER

## 2024-04-10 PROCEDURE — 99396 PREV VISIT EST AGE 40-64: CPT | Performed by: NURSE PRACTITIONER

## 2024-04-10 ASSESSMENT — ENCOUNTER SYMPTOMS
NEUROLOGICAL NEGATIVE: 1
ENDOCRINE NEGATIVE: 1
GASTROINTESTINAL NEGATIVE: 1
MUSCULOSKELETAL NEGATIVE: 1
PSYCHIATRIC NEGATIVE: 1
CONSTITUTIONAL NEGATIVE: 1
RESPIRATORY NEGATIVE: 1
EYES NEGATIVE: 1
CARDIOVASCULAR NEGATIVE: 1

## 2024-04-10 NOTE — PROGRESS NOTES
"Subjective   Patient ID: Ivelisse Pickens \"Tootie" is a 53 y.o. female who presents for Annual Exam (Hysterectomy 7 years ago with Dr. Reagan due to fibroids and bleeding. /Patient diagnosed with breast cancer summer of 2023.).  53 year old here for annual exam.  She had a partial hysterectomy 7 years ago.  She notes that only her ovaries remain.  She also had breast cancer in the left breast in 2023.  She had the area excised and margins were clear last summer.  She denies any abnormal bleeding, pain, discharge, urinary changes and breast complaints.  She is having her next mammogram when she hits 1 year from her radiation treatment.          Review of Systems   Constitutional: Negative.    HENT: Negative.     Eyes: Negative.    Respiratory: Negative.     Cardiovascular: Negative.    Gastrointestinal: Negative.    Endocrine: Negative.    Genitourinary: Negative.    Musculoskeletal: Negative.    Skin: Negative.    Neurological: Negative.    Psychiatric/Behavioral: Negative.         Objective   Physical Exam  Vitals reviewed.   Constitutional:       Appearance: Normal appearance. She is well-developed.   Pulmonary:      Effort: Pulmonary effort is normal. No respiratory distress.   Chest:   Breasts:     Breasts are symmetrical.      Right: Normal. No swelling, bleeding, inverted nipple, mass, nipple discharge, skin change or tenderness.      Left: Normal. No swelling, bleeding, inverted nipple, mass, nipple discharge, skin change or tenderness.   Abdominal:      Palpations: Abdomen is soft.   Genitourinary:     General: Normal vulva.      Exam position: Lithotomy position.      Pubic Area: No rash.       Labia:         Right: No rash, tenderness, lesion or injury.         Left: No rash, tenderness, lesion or injury.       Urethra: No prolapse, urethral pain, urethral swelling or urethral lesion.      Vagina: Normal.      Uterus: Absent.       Adnexa: Right adnexa normal and left adnexa normal.      Rectum: Normal. "   Musculoskeletal:         General: Normal range of motion.   Lymphadenopathy:      Upper Body:      Right upper body: No supraclavicular, axillary or pectoral adenopathy.      Left upper body: No supraclavicular, axillary or pectoral adenopathy.   Skin:     General: Skin is warm and dry.   Neurological:      General: No focal deficit present.      Mental Status: She is alert and oriented to person, place, and time. Mental status is at baseline.   Psychiatric:         Attention and Perception: Attention and perception normal.         Mood and Affect: Mood and affect normal.         Speech: Speech normal.         Behavior: Behavior normal. Behavior is cooperative.         Thought Content: Thought content normal.         Judgment: Judgment normal.     Exam performed by Silverio LOPEZ, 2nd exam performed by student Zulema ROLLE student [facility] upon patient agreement.      Assessment/Plan   Problem List Items Addressed This Visit             ICD-10-CM    Women's annual routine gynecological examination - Primary Z01.419     Pap/hpv sent   Mammogram ordered through breast surgeon  Follow up 1 year or as needed       VESTA Giles-CNP 04/10/24 9:28 AM

## 2024-04-15 PROCEDURE — RXMED WILLOW AMBULATORY MEDICATION CHARGE

## 2024-04-19 ENCOUNTER — PHARMACY VISIT (OUTPATIENT)
Dept: PHARMACY | Facility: CLINIC | Age: 54
End: 2024-04-19
Payer: COMMERCIAL

## 2024-05-02 ENCOUNTER — TELEPHONE (OUTPATIENT)
Dept: PRIMARY CARE | Facility: CLINIC | Age: 54
End: 2024-05-02
Payer: COMMERCIAL

## 2024-05-16 PROCEDURE — RXMED WILLOW AMBULATORY MEDICATION CHARGE

## 2024-05-18 ENCOUNTER — PHARMACY VISIT (OUTPATIENT)
Dept: PHARMACY | Facility: CLINIC | Age: 54
End: 2024-05-18
Payer: COMMERCIAL

## 2024-05-20 ENCOUNTER — HOSPITAL ENCOUNTER (OUTPATIENT)
Dept: RADIATION ONCOLOGY | Facility: CLINIC | Age: 54
Setting detail: RADIATION/ONCOLOGY SERIES
Discharge: HOME | End: 2024-05-20
Payer: COMMERCIAL

## 2024-05-20 VITALS
OXYGEN SATURATION: 99 % | SYSTOLIC BLOOD PRESSURE: 128 MMHG | WEIGHT: 216.9 LBS | RESPIRATION RATE: 18 BRPM | DIASTOLIC BLOOD PRESSURE: 84 MMHG | HEART RATE: 95 BPM | TEMPERATURE: 96.8 F | BODY MASS INDEX: 32.98 KG/M2

## 2024-05-20 DIAGNOSIS — D05.12 DUCTAL CARCINOMA IN SITU (DCIS) OF LEFT BREAST: Primary | ICD-10-CM

## 2024-05-20 PROCEDURE — 99213 OFFICE O/P EST LOW 20 MIN: CPT | Performed by: STUDENT IN AN ORGANIZED HEALTH CARE EDUCATION/TRAINING PROGRAM

## 2024-05-20 PROCEDURE — 99214 OFFICE O/P EST MOD 30 MIN: CPT | Performed by: STUDENT IN AN ORGANIZED HEALTH CARE EDUCATION/TRAINING PROGRAM

## 2024-05-20 ASSESSMENT — PATIENT HEALTH QUESTIONNAIRE - PHQ9
SUM OF ALL RESPONSES TO PHQ9 QUESTIONS 1 & 2: 0
2. FEELING DOWN, DEPRESSED OR HOPELESS: NOT AT ALL
1. LITTLE INTEREST OR PLEASURE IN DOING THINGS: NOT AT ALL

## 2024-05-20 ASSESSMENT — ENCOUNTER SYMPTOMS
DEPRESSION: 0
OCCASIONAL FEELINGS OF UNSTEADINESS: 0
LOSS OF SENSATION IN FEET: 0

## 2024-05-20 ASSESSMENT — PAIN SCALES - GENERAL: PAINLEVEL: 0-NO PAIN

## 2024-05-20 NOTE — PROGRESS NOTES
Radiation Oncology Nursing Note    Pain: The patient's current pain level was assessed.  They report currently having a pain of 0 out of 10.  They feel their pain is under control without the use of pain medications.    Review of Systems:  Review of Systems - Oncology

## 2024-05-20 NOTE — PROGRESS NOTES
"Radiation Oncology Follow-Up    Patient Name:  Ivelisse Pickens  MRN:  [id number]  :  1970    Referring Provider: No ref. provider found  Primary Care Provider: Chidi Blood DO  Care Team: Patient Care Team:  Chidi Blood DO as PCP - General  Chidi Blood DO as PCP - Employee [employer] PCP  Zoran Hodge MD as Consulting Physician (Hematology and Oncology)    Date of Service: 2024    SUBJECTIVE  History of Present Illness:  Ivelisse Pickens \"Marlin\" is a 53 y.o. 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 She received postop left breast radiation 42.56 Gy/16 fractions followed by tumor bed boost 10 Gy/4 fractions completed on 23.     Since her last visit she has continued on tamoxifen and she is tolerating it well. She does have increased hot flashes during the day and several times each night, but she has been ok with this. She does have some mild fatigue but she continues to work full time as usual. She denies breast swelling, shoulder stiffness, arm swelling, headaches, vision changes, nausea/vomiting, focal weakness/numbness, or new back/bone pains. She does have very occasional twinges of pain in the left breast that are not very bothersome.    Treatment Rendered:   Other Treatments    Treatment Period Technique Fraction Dose Fractions Total Dose   Course 1 10/17/2023-2023  (days elapsed: 27)         Lt Breast 10/17/2023-2023 3D 266 / 266 cGy 15 / 16 3990 / 4,256 cGy         Tumor Bed Boost 2023-2023 3D 250 / 250 cGy  1000 / 1,000 cGy       Review of Systems:   Review of Systems   All other systems reviewed and are negative.      Performance Status:   The Karnofsky performance scale today is 100, Fully active, able to carry on all pre-disease performed without restriction (ECOG " equivalent 0).       OBJECTIVE  Vital Signs:  /84 (BP Location: Left arm, Patient Position: Sitting, BP Cuff Size: Large adult long)   Pulse 95   Temp 36 °C (96.8 °F) (Temporal)   Resp 18   Wt 98.4 kg (216 lb 14.4 oz)   SpO2 99%   BMI 32.98 kg/m²   Physical Exam  Constitutional:       General: She is not in acute distress.     Appearance: Normal appearance. She is not toxic-appearing.   HENT:      Head: Normocephalic and atraumatic.      Mouth/Throat:      Mouth: Mucous membranes are moist.      Pharynx: Oropharynx is clear. No oropharyngeal exudate or posterior oropharyngeal erythema.   Eyes:      General: No scleral icterus.     Extraocular Movements: Extraocular movements intact.      Conjunctiva/sclera: Conjunctivae normal.      Pupils: Pupils are equal, round, and reactive to light.   Pulmonary:      Effort: Pulmonary effort is normal. No respiratory distress.   Chest:   Breasts:     Right: Normal.      Left: No swelling, bleeding, inverted nipple, mass, nipple discharge, skin change or tenderness.      Comments: Left UOQ lumpectomy and SLNB incisions well-healed and intact. No suspicious breast masses, skin changes, or nipple discharge.  Musculoskeletal:         General: Normal range of motion.      Cervical back: Normal range of motion and neck supple.      Right lower leg: No edema.      Left lower leg: No edema.   Lymphadenopathy:      Cervical: No cervical adenopathy.      Upper Body:      Right upper body: No supraclavicular, axillary or pectoral adenopathy.      Left upper body: No supraclavicular, axillary or pectoral adenopathy.   Skin:     General: Skin is warm and dry.      Coloration: Skin is not jaundiced.      Findings: No lesion or rash.   Neurological:      General: No focal deficit present.      Mental Status: She is alert and oriented to person, place, and time.      Cranial Nerves: No cranial nerve deficit.      Sensory: No sensory deficit.      Motor: No weakness.       Coordination: Coordination normal.      Gait: Gait normal.   Psychiatric:         Mood and Affect: Mood normal.         Behavior: Behavior normal.            ASSESSMENT:   Ivelisse Pickens is a 53 y.o. 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 She received postop left breast radiation 42.56 Gy/16 fractions followed by tumor bed boost 10 Gy/4 fractions completed on 11/13/23. She is now on tamoxifen.    She has no evidence of disease on exam today and she maintains excellent performance status (ECOG 0).    She will follow up with Dr. Hodge in 9/2024, and she will have mammograms and follow up with Dr. Howard in 10/2024. I will see her for follow up in 12/2024.    NCCN Guidelines were applicable to guide this patients treatment plan.    Satya Boogie MD

## 2024-05-22 ENCOUNTER — APPOINTMENT (OUTPATIENT)
Dept: PRIMARY CARE | Facility: CLINIC | Age: 54
End: 2024-05-22
Payer: COMMERCIAL

## 2024-06-06 DIAGNOSIS — E78.2 MIXED HYPERLIPIDEMIA: ICD-10-CM

## 2024-06-06 PROCEDURE — RXMED WILLOW AMBULATORY MEDICATION CHARGE

## 2024-06-06 RX ORDER — EZETIMIBE 10 MG/1
10 TABLET ORAL DAILY
Qty: 30 TABLET | Refills: 5 | Status: SHIPPED | OUTPATIENT
Start: 2024-06-06 | End: 2024-12-03

## 2024-06-10 ENCOUNTER — PHARMACY VISIT (OUTPATIENT)
Dept: PHARMACY | Facility: CLINIC | Age: 54
End: 2024-06-10
Payer: COMMERCIAL

## 2024-06-10 PROCEDURE — RXOTC WILLOW AMBULATORY OTC CHARGE

## 2024-06-24 PROCEDURE — RXMED WILLOW AMBULATORY MEDICATION CHARGE

## 2024-06-27 ENCOUNTER — PHARMACY VISIT (OUTPATIENT)
Dept: PHARMACY | Facility: CLINIC | Age: 54
End: 2024-06-27
Payer: COMMERCIAL

## 2024-07-03 DIAGNOSIS — E11.9 TYPE 2 DIABETES MELLITUS WITHOUT COMPLICATION, WITHOUT LONG-TERM CURRENT USE OF INSULIN (MULTI): ICD-10-CM

## 2024-07-03 PROCEDURE — RXMED WILLOW AMBULATORY MEDICATION CHARGE

## 2024-07-03 RX ORDER — LANCETS 26 GAUGE
EACH MISCELLANEOUS
Qty: 1 EACH | Refills: 0 | Status: SHIPPED | OUTPATIENT
Start: 2024-07-03 | End: 2025-07-03

## 2024-07-07 ENCOUNTER — HOSPITAL ENCOUNTER (EMERGENCY)
Facility: HOSPITAL | Age: 54
Discharge: HOME | End: 2024-07-07
Payer: COMMERCIAL

## 2024-07-07 VITALS
BODY MASS INDEX: 33.34 KG/M2 | OXYGEN SATURATION: 98 % | RESPIRATION RATE: 20 BRPM | HEART RATE: 100 BPM | WEIGHT: 220 LBS | SYSTOLIC BLOOD PRESSURE: 123 MMHG | HEIGHT: 68 IN | TEMPERATURE: 96.2 F | DIASTOLIC BLOOD PRESSURE: 83 MMHG

## 2024-07-07 DIAGNOSIS — S81.812A LACERATION OF LEFT LOWER EXTREMITY, INITIAL ENCOUNTER: Primary | ICD-10-CM

## 2024-07-07 PROCEDURE — 12002 RPR S/N/AX/GEN/TRNK2.6-7.5CM: CPT | Performed by: NURSE PRACTITIONER

## 2024-07-07 PROCEDURE — 90471 IMMUNIZATION ADMIN: CPT | Performed by: NURSE PRACTITIONER

## 2024-07-07 PROCEDURE — 2500000004 HC RX 250 GENERAL PHARMACY W/ HCPCS (ALT 636 FOR OP/ED): Performed by: NURSE PRACTITIONER

## 2024-07-07 PROCEDURE — 99283 EMERGENCY DEPT VISIT LOW MDM: CPT | Mod: 25

## 2024-07-07 PROCEDURE — 2500000001 HC RX 250 WO HCPCS SELF ADMINISTERED DRUGS (ALT 637 FOR MEDICARE OP): Performed by: NURSE PRACTITIONER

## 2024-07-07 PROCEDURE — 90715 TDAP VACCINE 7 YRS/> IM: CPT | Performed by: NURSE PRACTITIONER

## 2024-07-07 RX ORDER — BACITRACIN ZINC 500 UNIT/G
OINTMENT IN PACKET (EA) TOPICAL ONCE
Status: COMPLETED | OUTPATIENT
Start: 2024-07-07 | End: 2024-07-07

## 2024-07-07 ASSESSMENT — PAIN - FUNCTIONAL ASSESSMENT: PAIN_FUNCTIONAL_ASSESSMENT: 0-10

## 2024-07-07 ASSESSMENT — LIFESTYLE VARIABLES
EVER HAD A DRINK FIRST THING IN THE MORNING TO STEADY YOUR NERVES TO GET RID OF A HANGOVER: NO
HAVE PEOPLE ANNOYED YOU BY CRITICIZING YOUR DRINKING: NO
EVER FELT BAD OR GUILTY ABOUT YOUR DRINKING: NO
HAVE YOU EVER FELT YOU SHOULD CUT DOWN ON YOUR DRINKING: NO
TOTAL SCORE: 0

## 2024-07-07 ASSESSMENT — PAIN SCALES - GENERAL: PAINLEVEL_OUTOF10: 0 - NO PAIN

## 2024-07-07 NOTE — ED PROVIDER NOTES
HPI   Chief Complaint   Patient presents with    Laceration     C/O laceration to left lower leg, bleeding controlled with pressure. Pt states she cut her leg on a chainsaw.       This is a 53-year-old  female presenting to the emergency room with a left lower leg laceration.  The patient was cutting wood with a chainsaw and accidentally cut the lower aspect of her left leg.  The patient denies any loss of motor function or sensation to the extremity.  The patient's tetanus is not up-to-date.  Patient was able to control bleeding with pressure.      History provided by:  Patient   used: No                        No data recorded                   Patient History   Past Medical History:   Diagnosis Date    Personal history of other medical treatment     History of screening mammography     Past Surgical History:   Procedure Laterality Date    BI MAMMO GUIDED BREAST LEFT LOCALIZATION Left 8/21/2023    BI MAMMO GUIDED LOCALIZATION BREAST LEFT 8/21/2023 POR MEENAKSHI    OTHER SURGICAL HISTORY  07/11/2019    Cholecystectomy    OTHER SURGICAL HISTORY  07/11/2019    Hysterectomy    OTHER SURGICAL HISTORY  07/11/2019    Tonsillectomy     Family History   Problem Relation Name Age of Onset    Diabetes Mother      Hyperlipidemia Father      Cancer Father  52        rectroperitenal liposarcoma     Social History     Tobacco Use    Smoking status: Never    Smokeless tobacco: Never   Substance Use Topics    Alcohol use: Never    Drug use: Never       Physical Exam   ED Triage Vitals [07/07/24 1525]   Temperature Heart Rate Respirations BP   35.7 °C (96.2 °F) 100 20 123/83      Pulse Ox Temp Source Heart Rate Source Patient Position   98 % Tympanic -- Sitting      BP Location FiO2 (%)     Left arm 21 %       Physical Exam  Vitals and nursing note reviewed.   HENT:      Head: Normocephalic.      Right Ear: External ear normal.      Left Ear: External ear normal.      Nose: Nose normal.      Mouth/Throat:       Pharynx: Oropharynx is clear.   Eyes:      Conjunctiva/sclera: Conjunctivae normal.   Cardiovascular:      Rate and Rhythm: Normal rate and regular rhythm.      Pulses: Normal pulses.      Heart sounds: Normal heart sounds.   Pulmonary:      Effort: Pulmonary effort is normal.      Breath sounds: Normal breath sounds.   Musculoskeletal:         General: Normal range of motion.      Comments: Patient has full range of motion of the left lower extremity.  No laxity noted with full range of motion against resistance.  Distal extremity with brisk cap refill and sensation intact.  Pulses are palpable and strong.  No calf pain.   Skin:     General: Skin is warm.      Capillary Refill: Capillary refill takes less than 2 seconds.             Comments: The patient has a 5 cm full-thickness stellate laceration noted to the left distal anterior shin.  Mild persistent bleeding.  No foreign bodies.  The wound is superficial in nature.  No bony involvement.  No tendon injury.   Neurological:      General: No focal deficit present.      Mental Status: She is alert.   Psychiatric:         Mood and Affect: Mood normal.         ED Course & MDM   Diagnoses as of 07/07/24 1625   Laceration of left lower extremity, initial encounter       Medical Decision Making  The patient was seen and evaluated by the nurse practitioner, Yajaira Allen.  The wound was very superficial in nature.  Imaging is not warranted.  The patient's tetanus was updated.  The patient was prepared for wound closure.  The patient was covered in sterile drape.  The wound was cleaned 3 times with Shur-Clens.  Local anesthesia was achieved using    mL of 1% lidocaine without epinephrine.  The wound was irrigated with copious amounts of sterile saline and explored.  The wound base was visualized.  No foreign bodies noted.  No tendon injury noted with full range of motion of the extremity.  No bony involvement.  The wound edges were approximated and secured with eight  4-0 interrupted Ethilon sutures .  Bacitracin and a dry sterile dressing were applied.  The patient tolerated the procedure well.  The patient was educated on wound care and signs and symptoms of infection.  The patient is to follow-up with their primary care physician in 10-14 days for wound evaluation and suture removal.  The patient is to use Tylenol and/or Motrin over-the-counter as directed.  The patient was discharged in stable condition with computer discharge instructions given.        Procedure  Procedures     VIRIDIANA Mares  07/07/24 1620       VIRIDIANA Mares  07/07/24 1622

## 2024-07-08 ENCOUNTER — PHARMACY VISIT (OUTPATIENT)
Dept: PHARMACY | Facility: CLINIC | Age: 54
End: 2024-07-08
Payer: COMMERCIAL

## 2024-07-08 PROCEDURE — RXOTC WILLOW AMBULATORY OTC CHARGE

## 2024-07-25 ENCOUNTER — PHARMACY VISIT (OUTPATIENT)
Dept: PHARMACY | Facility: CLINIC | Age: 54
End: 2024-07-25
Payer: COMMERCIAL

## 2024-07-25 PROCEDURE — RXMED WILLOW AMBULATORY MEDICATION CHARGE

## 2024-07-25 PROCEDURE — RXOTC WILLOW AMBULATORY OTC CHARGE

## 2024-07-29 PROCEDURE — RXMED WILLOW AMBULATORY MEDICATION CHARGE

## 2024-08-01 ENCOUNTER — PHARMACY VISIT (OUTPATIENT)
Dept: PHARMACY | Facility: CLINIC | Age: 54
End: 2024-08-01
Payer: COMMERCIAL

## 2024-08-15 PROCEDURE — RXMED WILLOW AMBULATORY MEDICATION CHARGE

## 2024-08-16 PROCEDURE — RXMED WILLOW AMBULATORY MEDICATION CHARGE

## 2024-08-17 ENCOUNTER — PHARMACY VISIT (OUTPATIENT)
Dept: PHARMACY | Facility: CLINIC | Age: 54
End: 2024-08-17
Payer: COMMERCIAL

## 2024-08-30 ENCOUNTER — PHARMACY VISIT (OUTPATIENT)
Dept: PHARMACY | Facility: CLINIC | Age: 54
End: 2024-08-30
Payer: COMMERCIAL

## 2024-08-30 PROCEDURE — RXOTC WILLOW AMBULATORY OTC CHARGE

## 2024-09-10 ENCOUNTER — LAB (OUTPATIENT)
Dept: LAB | Facility: LAB | Age: 54
End: 2024-09-10
Payer: COMMERCIAL

## 2024-09-10 DIAGNOSIS — D05.12 DUCTAL CARCINOMA IN SITU (DCIS) OF LEFT BREAST: ICD-10-CM

## 2024-09-10 LAB
ALBUMIN SERPL BCP-MCNC: 4.2 G/DL (ref 3.4–5)
ALP SERPL-CCNC: 40 U/L (ref 33–110)
ALT SERPL W P-5'-P-CCNC: 14 U/L (ref 7–45)
ANION GAP SERPL CALC-SCNC: 12 MMOL/L (ref 10–20)
AST SERPL W P-5'-P-CCNC: 13 U/L (ref 9–39)
BASOPHILS # BLD AUTO: 0.06 X10*3/UL (ref 0–0.1)
BASOPHILS NFR BLD AUTO: 0.8 %
BILIRUB SERPL-MCNC: 0.3 MG/DL (ref 0–1.2)
BUN SERPL-MCNC: 16 MG/DL (ref 6–23)
CALCIUM SERPL-MCNC: 8.8 MG/DL (ref 8.6–10.3)
CHLORIDE SERPL-SCNC: 106 MMOL/L (ref 98–107)
CO2 SERPL-SCNC: 23 MMOL/L (ref 21–32)
CREAT SERPL-MCNC: 0.68 MG/DL (ref 0.5–1.05)
EGFRCR SERPLBLD CKD-EPI 2021: >90 ML/MIN/1.73M*2
EOSINOPHIL # BLD AUTO: 0.19 X10*3/UL (ref 0–0.7)
EOSINOPHIL NFR BLD AUTO: 2.4 %
ERYTHROCYTE [DISTWIDTH] IN BLOOD BY AUTOMATED COUNT: 15.1 % (ref 11.5–14.5)
GLUCOSE SERPL-MCNC: 96 MG/DL (ref 74–99)
HCT VFR BLD AUTO: 39.9 % (ref 36–46)
HGB BLD-MCNC: 13.1 G/DL (ref 12–16)
IMM GRANULOCYTES # BLD AUTO: 0.02 X10*3/UL (ref 0–0.7)
IMM GRANULOCYTES NFR BLD AUTO: 0.3 % (ref 0–0.9)
LYMPHOCYTES # BLD AUTO: 2.89 X10*3/UL (ref 1.2–4.8)
LYMPHOCYTES NFR BLD AUTO: 36.4 %
MCH RBC QN AUTO: 27.6 PG (ref 26–34)
MCHC RBC AUTO-ENTMCNC: 32.8 G/DL (ref 32–36)
MCV RBC AUTO: 84 FL (ref 80–100)
MONOCYTES # BLD AUTO: 0.48 X10*3/UL (ref 0.1–1)
MONOCYTES NFR BLD AUTO: 6.1 %
NEUTROPHILS # BLD AUTO: 4.29 X10*3/UL (ref 1.2–7.7)
NEUTROPHILS NFR BLD AUTO: 54 %
NRBC BLD-RTO: 0 /100 WBCS (ref 0–0)
PLATELET # BLD AUTO: 245 X10*3/UL (ref 150–450)
POTASSIUM SERPL-SCNC: 4.1 MMOL/L (ref 3.5–5.3)
PROT SERPL-MCNC: 6.4 G/DL (ref 6.4–8.2)
RBC # BLD AUTO: 4.74 X10*6/UL (ref 4–5.2)
SODIUM SERPL-SCNC: 137 MMOL/L (ref 136–145)
WBC # BLD AUTO: 7.9 X10*3/UL (ref 4.4–11.3)

## 2024-09-10 PROCEDURE — 36415 COLL VENOUS BLD VENIPUNCTURE: CPT

## 2024-09-10 PROCEDURE — 80053 COMPREHEN METABOLIC PANEL: CPT

## 2024-09-10 PROCEDURE — 85025 COMPLETE CBC W/AUTO DIFF WBC: CPT

## 2024-09-12 ENCOUNTER — PHARMACY VISIT (OUTPATIENT)
Dept: PHARMACY | Facility: CLINIC | Age: 54
End: 2024-09-12
Payer: COMMERCIAL

## 2024-09-12 PROCEDURE — RXMED WILLOW AMBULATORY MEDICATION CHARGE

## 2024-09-16 ENCOUNTER — HOSPITAL ENCOUNTER (OUTPATIENT)
Dept: RADIOLOGY | Facility: HOSPITAL | Age: 54
Discharge: HOME | End: 2024-09-16
Payer: COMMERCIAL

## 2024-09-16 VITALS — HEIGHT: 68 IN | WEIGHT: 213 LBS | BODY MASS INDEX: 32.28 KG/M2

## 2024-09-16 DIAGNOSIS — Z12.31 SCREENING MAMMOGRAM FOR HIGH-RISK PATIENT: ICD-10-CM

## 2024-09-16 DIAGNOSIS — Z85.3 HISTORY OF LEFT BREAST CANCER: ICD-10-CM

## 2024-09-16 PROCEDURE — 77067 SCR MAMMO BI INCL CAD: CPT

## 2024-09-16 PROCEDURE — 77067 SCR MAMMO BI INCL CAD: CPT | Performed by: RADIOLOGY

## 2024-09-16 PROCEDURE — 77063 BREAST TOMOSYNTHESIS BI: CPT | Performed by: RADIOLOGY

## 2024-09-18 ENCOUNTER — APPOINTMENT (OUTPATIENT)
Dept: PRIMARY CARE | Facility: CLINIC | Age: 54
End: 2024-09-18
Payer: COMMERCIAL

## 2024-09-18 VITALS
HEART RATE: 93 BPM | WEIGHT: 218 LBS | HEIGHT: 68 IN | SYSTOLIC BLOOD PRESSURE: 120 MMHG | DIASTOLIC BLOOD PRESSURE: 84 MMHG | BODY MASS INDEX: 33.04 KG/M2 | OXYGEN SATURATION: 97 %

## 2024-09-18 DIAGNOSIS — M79.10 MYALGIA DUE TO STATIN: ICD-10-CM

## 2024-09-18 DIAGNOSIS — T46.6X5A MYALGIA DUE TO STATIN: ICD-10-CM

## 2024-09-18 DIAGNOSIS — Z23 ENCOUNTER FOR IMMUNIZATION: ICD-10-CM

## 2024-09-18 DIAGNOSIS — Z00.00 ANNUAL PHYSICAL EXAM: Primary | ICD-10-CM

## 2024-09-18 DIAGNOSIS — E11.9 TYPE 2 DIABETES MELLITUS WITHOUT COMPLICATION, WITHOUT LONG-TERM CURRENT USE OF INSULIN (MULTI): ICD-10-CM

## 2024-09-18 LAB — POC HEMOGLOBIN A1C: 5.7 % (ref 4.2–6.5)

## 2024-09-18 PROCEDURE — 90471 IMMUNIZATION ADMIN: CPT | Performed by: FAMILY MEDICINE

## 2024-09-18 PROCEDURE — 83036 HEMOGLOBIN GLYCOSYLATED A1C: CPT | Performed by: FAMILY MEDICINE

## 2024-09-18 PROCEDURE — 90750 HZV VACC RECOMBINANT IM: CPT | Performed by: FAMILY MEDICINE

## 2024-09-18 PROCEDURE — 3079F DIAST BP 80-89 MM HG: CPT | Performed by: FAMILY MEDICINE

## 2024-09-18 PROCEDURE — 3074F SYST BP LT 130 MM HG: CPT | Performed by: FAMILY MEDICINE

## 2024-09-18 PROCEDURE — 99396 PREV VISIT EST AGE 40-64: CPT | Performed by: FAMILY MEDICINE

## 2024-09-18 PROCEDURE — 3008F BODY MASS INDEX DOCD: CPT | Performed by: FAMILY MEDICINE

## 2024-09-18 RX ORDER — DULAGLUTIDE 4.5 MG/.5ML
4.5 INJECTION, SOLUTION SUBCUTANEOUS WEEKLY
Qty: 6 ML | Refills: 3 | Status: SHIPPED | OUTPATIENT
Start: 2024-09-18 | End: 2025-09-18

## 2024-09-18 NOTE — PROGRESS NOTES
"Subjective   Patient ID: Ivelisse Pickens \"Tootie" is a 53 y.o. female who presents for follow-up on diabetes and chronic medical conditions    Subjective:    Eating: Cutting back and eating a lot of vegetables and less bread  Exercise: She is busy at work.  Not doing particular exercise  Breast:  mammogram normal 9/24.  Tamoxifen for 5 years  GYN:  Hysterectomy.  Saw [facility]  Colon Cancer screening normal 6/2023 with cologuard  Dental and Vision are  normal.        Reviewed chronic medical conditions and labs    Review of Systems    Objective   Physical Exam  General: Patient is alert and oriented ×3 and appears in no acute distress. No respiratory distress.    Head: Atraumatic normocephalic.    Eyes: EOMI, PERRLA      Ears: Canals patent without any irritation, tympanic membranes without inflammation, no swelling, normal light reflex.    Mouth: Normal mucosa. Moist. No erythema, exudates, tonsillar enlargement.    Neck: Normal range of motion, no masses.  Thyroid is palpable and normal in size without any nodules. No anterior cervical or posterior cervical adenopathy.    Heart: Regular rate and rhythm, no murmurs clicks or gallops    Lungs: Clear to auscultation bilaterally without any rhonchi rales or wheezing, lung sounds heard throughout all lung fields    Musculoskeletal: Normal range of motion, strength is grossly intact in the proximal distal muscles of the upper and lower extremities bilaterally, deep tendon reflexes +2 out of 4 and symmetric bilaterally at the patella, Achilles, biceps, triceps, sensation intact.    Nerves: Cranial nerves II through XII appear grossly intact and without deficit    Skin: Intact, dry, no rashes or erythema    Psych: Normal affect.  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  Finished up with radiation.  Mammogram negative 9/2024    Diabetes mellitus type 2  - Hemoglobin A1c was controlled at 5.7 9/18/24  - Not on a statin and LDL is uncontrolled.  Could not tolerate statins so we started Zetia  - Microalbumin has been negative  - Follow-up with eye doctor regularly  - Denies any cut scrapes or sores on the feet  - Currently on Trulicity 3 mg weekly and Jardiance 10 mg daily    Hyperlipidemia  - Zetia  - Statin caused severe muscle pain.     Follow-up in 6 months or as needed

## 2024-09-23 ENCOUNTER — OFFICE VISIT (OUTPATIENT)
Dept: HEMATOLOGY/ONCOLOGY | Facility: CLINIC | Age: 54
End: 2024-09-23
Payer: COMMERCIAL

## 2024-09-23 VITALS
SYSTOLIC BLOOD PRESSURE: 127 MMHG | DIASTOLIC BLOOD PRESSURE: 89 MMHG | HEIGHT: 67 IN | WEIGHT: 216.6 LBS | OXYGEN SATURATION: 100 % | TEMPERATURE: 97.7 F | RESPIRATION RATE: 16 BRPM | HEART RATE: 89 BPM | BODY MASS INDEX: 34 KG/M2

## 2024-09-23 DIAGNOSIS — D05.12 DUCTAL CARCINOMA IN SITU (DCIS) OF LEFT BREAST: ICD-10-CM

## 2024-09-23 PROCEDURE — RXMED WILLOW AMBULATORY MEDICATION CHARGE

## 2024-09-23 PROCEDURE — 3074F SYST BP LT 130 MM HG: CPT | Performed by: INTERNAL MEDICINE

## 2024-09-23 PROCEDURE — 3008F BODY MASS INDEX DOCD: CPT | Performed by: INTERNAL MEDICINE

## 2024-09-23 PROCEDURE — 99214 OFFICE O/P EST MOD 30 MIN: CPT | Performed by: INTERNAL MEDICINE

## 2024-09-23 PROCEDURE — 3079F DIAST BP 80-89 MM HG: CPT | Performed by: INTERNAL MEDICINE

## 2024-09-23 ASSESSMENT — PAIN SCALES - GENERAL: PAINLEVEL: 0-NO PAIN

## 2024-09-23 NOTE — PROGRESS NOTES
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 daily started on November 28, 2023 9/16/24 , mammogram negative    Interval History:      9/23/24  Patient has a history of DCIS as of left breast status post resection and radiotherapy.  Post radiotherapy patient doing very well no any other complaint.        HPI  Referred by Dr. Howard      Screening mammogram on 9/23/22 showed developing calcifications and architectural alteration in the left breast UOQ (BI-RADS 0). Diagnostic  mammogram and ultrasound on 10/18/22 was read as calcifications with benign morphology (BI-RADS 3). Repeat diagnostic mammogram on 4/11/23 showed subtle increase in the number of calcifications that was suspicious (BI-RADS 4).     Biopsy of the calcifications on 4/19/23 showed intermediate grade DCIS, solid and cribriform pattern with calcifications, ER>95%.     Breast MRI on 6/2/23 showed non-mass enhancement in the right breast and minimal left breast biopsy changes with no additional sites of disease seen. There was no axillary or internal mammary adenopathy. MRI guided biopsy of the right breast on 7/19/23  showed perilobular hemangioma.     On 8/23/23, Dr. Howard took her to the OR for left lumpectomy/SLNB. Pathology showed DCIS measuring up to 1.2 cm, grade 2, with negative margins (>2 mm). One sentinel node was negative.   Medical oncology consultation was requested because of preventive therapy.  Patient is already deceiving go radiotherapy which will finish on normal testing 2023.       ROS  No headache and dizziness, no hot flash, no fever, no chest pain and shortness of breath, no nausea vomiting, no abdominal pain, no " diarrhea and constipation.  Status post hysterectomy, patient still have ovaries.    Patient is active working full-time    Past Medical History:   Diagnosis Date    Personal history of other medical treatment     History of screening mammography      Past Surgical History:   Procedure Laterality Date    BI MAMMO GUIDED BREAST LEFT LOCALIZATION Left 8/21/2023    BI MAMMO GUIDED LOCALIZATION BREAST LEFT 8/21/2023 POR MEENAKSHI    OTHER SURGICAL HISTORY  07/11/2019    Cholecystectomy    OTHER SURGICAL HISTORY  07/11/2019    Hysterectomy    OTHER SURGICAL HISTORY  07/11/2019    Tonsillectomy        Family History   Problem Relation Name Age of Onset    Diabetes Mother      Hyperlipidemia Father      Cancer Father  52        rectroperitenal liposarcoma      Social History     Tobacco Use   Smoking Status Never   Smokeless Tobacco Never      Current Outpatient Medications:     alcohol swabs pads, medicated, Use to test blood sugars 2-3 times a week as directed, Disp: 100 each, Rfl: 3    aspirin 81 mg EC tablet, Take 1 tablet (81 mg) by mouth once daily., Disp: , Rfl:     Autolet (Accu-Chek FastClix Lancing Dev) lancing device, Use with lancets to test blood sugars 2-3 times a week as directed, Disp: 1 each, Rfl: 0    blood sugar diagnostic (Accu-Chek Guide test strips) strip, Use to test blood sugars 2-3 times a week as directed, Disp: 100 each, Rfl: 3    blood-glucose meter (Accu-Chek Guide Glucose Meter) misc, Use to test blood sugars 2-3 times a week as directed, Disp: 1 each, Rfl: 0    cholecalciferol (Vitamin D-3) 25 MCG (1000 UT) capsule, Take 1 capsule (25 mcg) by mouth once daily., Disp: , Rfl:     dextromethorphan-guaifenesin (Mucinex DM)  mg 12 hr tablet, Take 1 tablet by mouth every 12 hours if needed for cough., Disp: , Rfl:     dulaglutide (Trulicity) 4.5 mg/0.5 mL pen injector, Inject 4.5 mg under the skin 1 (one) time per week., Disp: 6 mL, Rfl: 3    empagliflozin (Jardiance) 10 mg, TAKE 1 TABLET BY MOUTH  "ONCE DAILY, Disp: 90 tablet, Rfl: 3    ezetimibe (Zetia) 10 mg tablet, Take 1 tablet (10 mg) by mouth once daily., Disp: 30 tablet, Rfl: 5    fluticasone (Flonase) 50 mcg/actuation nasal spray, Administer 1 spray into each nostril once daily as needed for allergies., Disp: , Rfl:     lancets (Accu-Chek Fastclix Lancet Drum) misc, Use with lancing device to test blood sugars 2-3 times a week as directed, Disp: 102 each, Rfl: 3    omeprazole (PriLOSEC) 20 mg DR capsule, Take 1 capsule (20 mg) by mouth once daily., Disp: , Rfl:     tamoxifen (Nolvadex) 20 mg tablet, Take 1 tablet (20 mg total) by mouth once daily.  Take with water or any other nonalcoholic drink with or without food at around the same time(s) every day., Disp: 90 tablet, Rfl: 3    triamcinolone (Kenalog) 0.1 % cream, Apply to affected area topically 2 to 3 times a day until rash clears, Disp: 80 g, Rfl: 0      Physical Exam      Last Recorded Vitals  Blood pressure 127/89, pulse 89, temperature 36.5 °C (97.7 °F), resp. rate 16, height 1.698 m (5' 6.85\"), weight 98.2 kg (216 lb 9.6 oz), SpO2 100%.     Relevant Results       (9/10/24) 6 mo ago  (3/25/24) 10 mo ago  (11/16/23) 1 yr ago  (8/11/23) 1 yr ago  (1/21/23) 2 yr ago  (4/1/22)    WBC  4.4 - 11.3 x10*3/uL 7.9 5.8 7.0 8.2 R 7.6 R 9.4 R   nRBC  0.0 - 0.0 /100 WBCs 0.0  0.0      RBC  4.00 - 5.20 x10*6/uL 4.74 4.64 5.10 4.62 R 5.30 High  R 5.00 R   Hemoglobin  12.0 - 16.0 g/dL 13.1 12.8 14.0 12.7 14.6 13.4   Hematocrit  36.0 - 46.0 % 39.9 39.4 43.1 38.6 44.3 41.7   MCV  80 - 100 fL 84 85 85 84 84 83   MCH  26.0 - 34.0 pg 27.6 27.6 27.5      MCHC  32.0 - 36.0 g/dL 32.8 32.5 32.5 32.9 33.0 32.1   RDW  11.5 - 14.5 % 15.1 High  14.8 High  13.9 14.1 13.6 14.3   Platelets  150 - 450 x10*3/uL 245 251 281         Assessment/Plan   1.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   status post radiotherapy.   Tamoxifen 20 mg p.o. daily " started on November 28, 2023    Continue tamoxifen for 5 years    Mammogram on regular basis    Discussed with patient    Zoran Hodge MD

## 2024-09-23 NOTE — PATIENT INSTRUCTIONS
Follow up visit for history of left breast DCIS.     Continue tamoxifen.     Follow up with Dr. Hodge in 1 year.

## 2024-09-27 ENCOUNTER — PHARMACY VISIT (OUTPATIENT)
Dept: PHARMACY | Facility: CLINIC | Age: 54
End: 2024-09-27
Payer: COMMERCIAL

## 2024-10-20 PROCEDURE — RXMED WILLOW AMBULATORY MEDICATION CHARGE

## 2024-10-24 ENCOUNTER — PHARMACY VISIT (OUTPATIENT)
Dept: PHARMACY | Facility: CLINIC | Age: 54
End: 2024-10-24
Payer: COMMERCIAL

## 2024-10-24 DIAGNOSIS — E11.9 TYPE 2 DIABETES MELLITUS WITHOUT COMPLICATION, WITHOUT LONG-TERM CURRENT USE OF INSULIN (MULTI): Primary | ICD-10-CM

## 2024-11-13 PROCEDURE — RXMED WILLOW AMBULATORY MEDICATION CHARGE

## 2024-11-14 ENCOUNTER — PHARMACY VISIT (OUTPATIENT)
Dept: PHARMACY | Facility: CLINIC | Age: 54
End: 2024-11-14
Payer: COMMERCIAL

## 2024-11-25 ENCOUNTER — PHARMACY VISIT (OUTPATIENT)
Dept: PHARMACY | Facility: CLINIC | Age: 54
End: 2024-11-25
Payer: COMMERCIAL

## 2024-11-25 PROCEDURE — RXOTC WILLOW AMBULATORY OTC CHARGE

## 2024-11-29 ENCOUNTER — PHARMACY VISIT (OUTPATIENT)
Dept: PHARMACY | Facility: CLINIC | Age: 54
End: 2024-11-29
Payer: COMMERCIAL

## 2024-11-29 PROCEDURE — RXOTC WILLOW AMBULATORY OTC CHARGE

## 2024-12-07 DIAGNOSIS — E78.2 MIXED HYPERLIPIDEMIA: ICD-10-CM

## 2024-12-07 DIAGNOSIS — D05.10 DUCTAL CARCINOMA IN SITU (DCIS) OF BREAST, UNSPECIFIED LATERALITY: ICD-10-CM

## 2024-12-07 PROCEDURE — RXMED WILLOW AMBULATORY MEDICATION CHARGE

## 2024-12-07 RX ORDER — EZETIMIBE 10 MG/1
10 TABLET ORAL DAILY
Qty: 30 TABLET | Refills: 5 | Status: CANCELLED | OUTPATIENT
Start: 2024-12-07 | End: 2025-06-05

## 2024-12-07 RX ORDER — TAMOXIFEN CITRATE 20 MG/1
20 TABLET ORAL DAILY
Qty: 90 TABLET | Refills: 3 | Status: CANCELLED | OUTPATIENT
Start: 2024-12-07 | End: 2025-12-07

## 2024-12-09 DIAGNOSIS — D05.10 DUCTAL CARCINOMA IN SITU (DCIS) OF BREAST, UNSPECIFIED LATERALITY: ICD-10-CM

## 2024-12-09 DIAGNOSIS — E78.2 MIXED HYPERLIPIDEMIA: ICD-10-CM

## 2024-12-09 RX ORDER — TAMOXIFEN CITRATE 20 MG/1
20 TABLET ORAL DAILY
Qty: 90 TABLET | Refills: 3 | Status: CANCELLED | OUTPATIENT
Start: 2024-12-07 | End: 2025-12-07

## 2024-12-09 RX ORDER — EZETIMIBE 10 MG/1
10 TABLET ORAL DAILY
Qty: 30 TABLET | Refills: 5 | Status: SHIPPED | OUTPATIENT
Start: 2024-12-09 | End: 2025-06-07

## 2024-12-10 ENCOUNTER — PHARMACY VISIT (OUTPATIENT)
Dept: PHARMACY | Facility: CLINIC | Age: 54
End: 2024-12-10
Payer: COMMERCIAL

## 2024-12-10 PROCEDURE — RXMED WILLOW AMBULATORY MEDICATION CHARGE

## 2024-12-12 PROCEDURE — RXMED WILLOW AMBULATORY MEDICATION CHARGE

## 2024-12-13 ENCOUNTER — PHARMACY VISIT (OUTPATIENT)
Dept: PHARMACY | Facility: CLINIC | Age: 54
End: 2024-12-13
Payer: COMMERCIAL

## 2024-12-17 ENCOUNTER — HOSPITAL ENCOUNTER (OUTPATIENT)
Dept: RADIATION ONCOLOGY | Facility: CLINIC | Age: 54
Setting detail: RADIATION/ONCOLOGY SERIES
Discharge: HOME | End: 2024-12-17
Payer: COMMERCIAL

## 2024-12-17 VITALS
TEMPERATURE: 97.2 F | WEIGHT: 217.3 LBS | DIASTOLIC BLOOD PRESSURE: 90 MMHG | RESPIRATION RATE: 16 BRPM | BODY MASS INDEX: 34.19 KG/M2 | SYSTOLIC BLOOD PRESSURE: 131 MMHG | OXYGEN SATURATION: 99 % | HEART RATE: 88 BPM

## 2024-12-17 DIAGNOSIS — D05.12 DUCTAL CARCINOMA IN SITU (DCIS) OF LEFT BREAST: ICD-10-CM

## 2024-12-17 PROCEDURE — 99214 OFFICE O/P EST MOD 30 MIN: CPT | Performed by: STUDENT IN AN ORGANIZED HEALTH CARE EDUCATION/TRAINING PROGRAM

## 2024-12-17 ASSESSMENT — PAIN SCALES - GENERAL: PAINLEVEL_OUTOF10: 0-NO PAIN

## 2024-12-17 NOTE — PROGRESS NOTES
"Radiation Oncology Follow-Up    Patient Name:  Ivelisse Pickens  MRN:  [id number]  :  1970    Referring Provider: No ref. provider found  Primary Care Provider: Chidi Blood DO  Care Team: Patient Care Team:  Chidi Blood DO as PCP - General  Chidi Blood DO as PCP - Employee [employer] PCP  Zoran Hodge MD as Consulting Physician (Hematology and Oncology)    Date of Service: 2024    SUBJECTIVE  History of Present Illness:  Ivelisse Pickens \"Marlin\" is a 54 y.o. female who was previously seen at the [facility] Department of Radiation Oncology for left breast DCIS (1.2 cm, intermediate grade, ER+) s/p left lumpectomy/SLNB on 23 with widely negative margins. She received postop left breast radiation 42.56 Gy/16 fractions followed by tumor bed boost 10 Gy/4 fractions completed on 23.     Bilateral screening mammograms on 24 showed no evidence of malignancy (BI-RADS 2).    Since her last visit she has continued on tamoxifen and she is tolerating it well with mild hot flashes. She does have some mild fatigue but she continues to work full time as usual. She denies breast swelling, shoulder stiffness, arm swelling, headaches, vision changes, nausea/vomiting, focal weakness/numbness, or new back/bone pains.     Treatment Rendered:   Other Treatments    Treatment Period Technique Fraction Dose Fractions Total Dose   Course 1 10/17/2023-2023  (days elapsed: 27)         Lt Breast 10/17/2023-2023 3D 266 / 266 cGy 15 / 16 3990 / 4,256 cGy         Tumor Bed Boost 2023-2023 3D 250 / 250 cGy  1000 / 1,000 cGy       Review of Systems:   Review of Systems   All other systems reviewed and are negative.      Performance Status:   The Karnofsky performance scale today is 100, Fully active, able to carry on all pre-disease performed without restriction (ECOG equivalent 0).       OBJECTIVE  Vital Signs:  /90   Pulse 88   Temp 36.2 °C (97.2 °F) " (Temporal)   Resp 16   Wt 98.6 kg (217 lb 4.8 oz)   SpO2 99%   BMI 34.19 kg/m²   Physical Exam  Constitutional:       General: She is not in acute distress.     Appearance: Normal appearance. She is not toxic-appearing.   HENT:      Head: Normocephalic and atraumatic.      Mouth/Throat:      Mouth: Mucous membranes are moist.      Pharynx: Oropharynx is clear. No oropharyngeal exudate or posterior oropharyngeal erythema.   Eyes:      General: No scleral icterus.     Extraocular Movements: Extraocular movements intact.      Conjunctiva/sclera: Conjunctivae normal.      Pupils: Pupils are equal, round, and reactive to light.   Pulmonary:      Effort: Pulmonary effort is normal. No respiratory distress.   Chest:   Breasts:     Right: Normal.      Left: No swelling, bleeding, inverted nipple, mass, nipple discharge, skin change or tenderness.      Comments: Left UOQ lumpectomy and SLNB incisions well-healed and intact. No suspicious breast masses, skin changes, or nipple discharge.  Musculoskeletal:         General: Normal range of motion.      Cervical back: Normal range of motion and neck supple.      Right lower leg: No edema.      Left lower leg: No edema.   Lymphadenopathy:      Cervical: No cervical adenopathy.      Upper Body:      Right upper body: No supraclavicular, axillary or pectoral adenopathy.      Left upper body: No supraclavicular, axillary or pectoral adenopathy.   Skin:     General: Skin is warm and dry.      Coloration: Skin is not jaundiced.      Findings: No lesion or rash.   Neurological:      General: No focal deficit present.      Mental Status: She is alert and oriented to person, place, and time.      Cranial Nerves: No cranial nerve deficit.      Sensory: No sensory deficit.      Motor: No weakness.      Coordination: Coordination normal.      Gait: Gait normal.   Psychiatric:         Mood and Affect: Mood normal.         Behavior: Behavior normal.            ASSESSMENT:   Ivelisse BLOUNT  Celio is a 54 y.o. 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 She received postop left breast radiation 42.56 Gy/16 fractions followed by tumor bed boost 10 Gy/4 fractions completed on 11/13/23. She is now on tamoxifen.    She has no evidence of disease on exam today and she maintains excellent performance status (ECOG 0). Bilateral screening mammograms on 9/16/24 were negative for malignancy (BI-RADS 2).    She will follow up with Dr. Hodge in 9/2024, and she will have mammograms and follow up with Dr. Howard in 10/2024. I will see her for follow up in 12/2025.    NCCN Guidelines were applicable to guide this patients treatment plan.    Satya Boogie MD

## 2024-12-17 NOTE — PROGRESS NOTES
Radiation Oncology Nursing Note    Pain: The patient's current pain level was assessed.  They report currently having a pain of 0 out of 10.  They feel their pain is under control without the use of pain medications.    Review of Systems:  Review of Systems - Oncology  Education Documentation  Treatment Plan and Schedule, taught by Yamilet Charles RN at 12/17/2024  3:27 PM.  Learner: Patient  Readiness: Acceptance  Method: Explanation  Response: Verbalizes Understanding    Education Comments  12/17/24 [id number] Yamilet Charles RN  Per Dr. Boogie, patient to follow up in 1 year. Patient verbalizes understanding with verbal teach back. Yamilet Charles MSN, RN, OCN

## 2024-12-29 PROCEDURE — RXMED WILLOW AMBULATORY MEDICATION CHARGE

## 2025-01-02 PROCEDURE — RXMED WILLOW AMBULATORY MEDICATION CHARGE

## 2025-01-06 ENCOUNTER — PHARMACY VISIT (OUTPATIENT)
Dept: PHARMACY | Facility: CLINIC | Age: 55
End: 2025-01-06
Payer: COMMERCIAL

## 2025-01-29 PROCEDURE — RXMED WILLOW AMBULATORY MEDICATION CHARGE

## 2025-01-31 ENCOUNTER — PHARMACY VISIT (OUTPATIENT)
Dept: PHARMACY | Facility: CLINIC | Age: 55
End: 2025-01-31
Payer: COMMERCIAL

## 2025-02-10 ENCOUNTER — PHARMACY VISIT (OUTPATIENT)
Dept: PHARMACY | Facility: CLINIC | Age: 55
End: 2025-02-10
Payer: COMMERCIAL

## 2025-02-10 PROCEDURE — RXMED WILLOW AMBULATORY MEDICATION CHARGE

## 2025-02-11 ENCOUNTER — PHARMACY VISIT (OUTPATIENT)
Dept: PHARMACY | Facility: CLINIC | Age: 55
End: 2025-02-11
Payer: COMMERCIAL

## 2025-02-19 ENCOUNTER — TELEMEDICINE (OUTPATIENT)
Dept: PHARMACY | Facility: HOSPITAL | Age: 55
End: 2025-02-19
Payer: COMMERCIAL

## 2025-02-19 DIAGNOSIS — E11.9 TYPE 2 DIABETES MELLITUS WITHOUT COMPLICATION, WITHOUT LONG-TERM CURRENT USE OF INSULIN (MULTI): ICD-10-CM

## 2025-02-20 NOTE — PROGRESS NOTES
"[facility] ([facility])    Ivelisse Pickens \"Marlin\" is a 54 y.o. female was referred to Clinical Pharmacy Team to complete a comprehensive medication review (CMR) with a pharmacist as part of the [employer] diabetes program.  Pharmacy team may also provide assistance in diabetes management per discussion with referring provider and/or endocrinology.    Referring Provider: Chidi Blood DO  Does patient follow with Endocrinology: No  Endocrinology Provider Name: NA    Subjective   Allergies   Allergen Reactions    Penicillins Hives    Atorvastatin Unknown       [facility]  [address] [address]  Phone: [phone] Fax: [phone]      Diabetes  She has type 2 diabetes mellitus. Her disease course has been stable. There are no hypoglycemic associated symptoms. There are no hypoglycemic complications. Risk factors for coronary artery disease include diabetes mellitus. Current diabetic treatments: Trulicity 4.5 mg weeekly; Jardiance 25 mg daily. She is compliant with treatment all of the time.       Objective     There were no vitals taken for this visit.     LAB  Lab Results   Component Value Date    BILITOT 0.3 09/10/2024    CALCIUM 8.8 09/10/2024    CO2 23 09/10/2024     09/10/2024    CREATININE 0.68 09/10/2024    GLUCOSE 96 09/10/2024    ALKPHOS 40 09/10/2024    K 4.1 09/10/2024    PROT 6.4 09/10/2024     09/10/2024    AST 13 09/10/2024    ALT 14 09/10/2024    BUN 16 09/10/2024    ANIONGAP 12 09/10/2024    MG 2.02 04/01/2022    ALBUMIN 4.2 09/10/2024    GFRF 80 08/11/2023     Lab Results   Component Value Date    TRIG 84 11/16/2023    CHOL 213 (H) 11/16/2023    LDLCALC 151 (H) 11/16/2023    HDL 44.9 11/16/2023     Lab Results   Component Value Date    HGBA1C 5.7 09/18/2024     Estimated body mass index is 34.19 kg/m² as calculated from the following:    Height as of 9/23/24: 1.698 m (5' 6.85\").    Weight as of " 12/17/24: 98.6 kg (217 lb 4.8 oz).     Current Outpatient Medications on File Prior to Visit   Medication Sig Dispense Refill    alcohol swabs pads, medicated Use to test blood sugars 2-3 times a week as directed 100 each 3    aspirin 81 mg EC tablet Take 1 tablet (81 mg) by mouth once daily.      Autolet (Accu-Chek FastClix Lancing Dev) lancing device Use with lancets to test blood sugars 2-3 times a week as directed 1 each 0    blood sugar diagnostic (Accu-Chek Guide test strips) strip Use to test blood sugars 2-3 times a week as directed 100 each 3    blood-glucose meter (Accu-Chek Guide Glucose Meter) misc Use to test blood sugars 2-3 times a week as directed 1 each 0    cholecalciferol (Vitamin D-3) 25 MCG (1000 UT) capsule Take 1 capsule (25 mcg) by mouth once daily.      dextromethorphan-guaifenesin (Mucinex DM)  mg 12 hr tablet Take 1 tablet by mouth every 12 hours if needed for cough.      dulaglutide (Trulicity) 4.5 mg/0.5 mL pen injector Inject 4.5 mg under the skin 1 (one) time per week. 6 mL 3    empagliflozin (Jardiance) 10 mg TAKE 1 TABLET BY MOUTH ONCE DAILY 90 tablet 3    ezetimibe (Zetia) 10 mg tablet Take 1 tablet (10 mg) by mouth once daily. 30 tablet 5    fluticasone (Flonase) 50 mcg/actuation nasal spray Administer 1 spray into each nostril once daily as needed for allergies.      lancets (Accu-Chek Fastclix Lancet Drum) misc Use with lancing device to test blood sugars 2-3 times a week as directed 102 each 3    omeprazole (PriLOSEC) 20 mg DR capsule Take 1 capsule (20 mg) by mouth once daily.      tamoxifen (Nolvadex) 20 mg tablet Take 1 tablet (20 mg total) by mouth once daily.  Take with water or any other nonalcoholic drink with or without food at around the same time(s) every day. 90 tablet 3    triamcinolone (Kenalog) 0.1 % cream Apply to affected area topically 2 to 3 times a day until rash clears 80 g 0     No current facility-administered medications on file prior to visit.       Secondary Prevention:  Statin? No  ACE-I/ARB? No  Aspirin? Yes    Pertinent PMH Review:  PMH of Pancreatitis: No  PMH of Retinopathy: No  Has patient had a yearly eye exam? Yes  PMH of Urinary Tract Infections: No  PMH of MTC: No    ASSESSMENT/PLAN:   Employee Health Diabetes Program ([facility])  - Patient enrolled in  Employee diabetes program for $0 co-pays on diabetes medications/supplies. Enrollment should be active in 2-4 weeks. Refills sent to  pharmacy for diabetes medications/supplies as part of  Employee Diabetes Program (subject to change per provider preferences)  - Requested [facility] enrollment date: 2/18/25  - PharmD Management Level: 2  -  Pharmacy fill location: [place]    Assessment/Plan   Problem List Items Addressed This Visit       Type 2 diabetes mellitus without complication, without long-term current use of insulin (Multi)          Follow up: 11 months    Continue all meds under the continuation of care with the referring provider and clinical pharmacy team.    Emory Otrega, PharmD     Verbal consent to manage patient's drug therapy was obtained from [the patient and/or an individual authorized to act on behalf of a patient]. They were informed they may decline to participate or withdraw from participation in pharmacy services at any time.

## 2025-02-21 ENCOUNTER — PHARMACY VISIT (OUTPATIENT)
Dept: PHARMACY | Facility: CLINIC | Age: 55
End: 2025-02-21
Payer: COMMERCIAL

## 2025-02-21 PROCEDURE — RXOTC WILLOW AMBULATORY OTC CHARGE

## 2025-02-25 PROCEDURE — RXMED WILLOW AMBULATORY MEDICATION CHARGE

## 2025-02-26 ENCOUNTER — PHARMACY VISIT (OUTPATIENT)
Dept: PHARMACY | Facility: CLINIC | Age: 55
End: 2025-02-26
Payer: COMMERCIAL

## 2025-03-18 ENCOUNTER — APPOINTMENT (OUTPATIENT)
Dept: PRIMARY CARE | Facility: CLINIC | Age: 55
End: 2025-03-18
Payer: COMMERCIAL

## 2025-03-18 VITALS
DIASTOLIC BLOOD PRESSURE: 76 MMHG | SYSTOLIC BLOOD PRESSURE: 124 MMHG | HEIGHT: 67 IN | HEART RATE: 90 BPM | BODY MASS INDEX: 34.06 KG/M2 | TEMPERATURE: 97.8 F | OXYGEN SATURATION: 98 % | WEIGHT: 217 LBS

## 2025-03-18 DIAGNOSIS — T46.6X5A MYALGIA DUE TO STATIN: ICD-10-CM

## 2025-03-18 DIAGNOSIS — E55.9 VITAMIN D DEFICIENCY: ICD-10-CM

## 2025-03-18 DIAGNOSIS — D05.12 NEOPLASM OF LEFT BREAST, PRIMARY TUMOR STAGING CATEGORY TIS: DUCTAL CARCINOMA IN SITU (DCIS): ICD-10-CM

## 2025-03-18 DIAGNOSIS — M79.10 MYALGIA DUE TO STATIN: ICD-10-CM

## 2025-03-18 DIAGNOSIS — E11.9 TYPE 2 DIABETES MELLITUS WITHOUT COMPLICATION, WITHOUT LONG-TERM CURRENT USE OF INSULIN (MULTI): Primary | ICD-10-CM

## 2025-03-18 DIAGNOSIS — Z23 ENCOUNTER FOR IMMUNIZATION: ICD-10-CM

## 2025-03-18 DIAGNOSIS — E78.2 MIXED HYPERLIPIDEMIA: ICD-10-CM

## 2025-03-18 DIAGNOSIS — E53.8 VITAMIN B 12 DEFICIENCY: ICD-10-CM

## 2025-03-18 LAB — POC HEMOGLOBIN A1C: 5.7 % (ref 4.2–6.5)

## 2025-03-18 PROCEDURE — 83036 HEMOGLOBIN GLYCOSYLATED A1C: CPT | Performed by: FAMILY MEDICINE

## 2025-03-18 PROCEDURE — 3074F SYST BP LT 130 MM HG: CPT | Performed by: FAMILY MEDICINE

## 2025-03-18 PROCEDURE — 3008F BODY MASS INDEX DOCD: CPT | Performed by: FAMILY MEDICINE

## 2025-03-18 PROCEDURE — 3078F DIAST BP <80 MM HG: CPT | Performed by: FAMILY MEDICINE

## 2025-03-18 PROCEDURE — 90750 HZV VACC RECOMBINANT IM: CPT | Performed by: FAMILY MEDICINE

## 2025-03-18 PROCEDURE — 99214 OFFICE O/P EST MOD 30 MIN: CPT | Performed by: FAMILY MEDICINE

## 2025-03-18 PROCEDURE — 1036F TOBACCO NON-USER: CPT | Performed by: FAMILY MEDICINE

## 2025-03-18 PROCEDURE — 90471 IMMUNIZATION ADMIN: CPT | Performed by: FAMILY MEDICINE

## 2025-03-18 NOTE — PROGRESS NOTES
"Subjective   Patient ID: Ivelisse Pickens \"Tootie" is a 54 y.o. female who presents for follow-up on diabetes and chronic medical conditions    Subjective:    Eating: Cutting back and eating a lot of vegetables and less bread  Exercise: She is busy at work. Not doing particular exercise  Breast:  Mammogram normal 9/24. Tamoxifen for 5 years.  Colon Cancer screening normal 6/2023 with cologuard  Dental and Vision are normal.      Reviewed chronic medical conditions and labs    Review of Systems    Objective   Physical Exam  General: Patient is alert and oriented ×3 and appears in no acute distress. No respiratory distress.    Head: Atraumatic normocephalic.    Eyes: EOMI, PERRLA      Ears: Canals patent without any irritation, tympanic membranes without inflammation, no swelling, normal light reflex.    Mouth: Normal mucosa. Moist. No erythema, exudates, tonsillar enlargement.    Neck: Normal range of motion, no masses.  Thyroid is palpable and normal in size without any nodules. No anterior cervical or posterior cervical adenopathy.    Heart: Regular rate and rhythm, no murmurs clicks or gallops    Lungs: Clear to auscultation bilaterally without any rhonchi rales or wheezing, lung sounds heard throughout all lung fields    Musculoskeletal: Normal range of motion, strength is grossly intact in the proximal distal muscles of the upper and lower extremities bilaterally, deep tendon reflexes +2 out of 4 and symmetric bilaterally at the patella, Achilles, biceps, triceps, sensation intact.    Nerves: Cranial nerves II through XII appear grossly intact and without deficit    Skin: Intact, dry, no rashes or erythema    Psych: Normal affect.  Assessment/Plan   Problem List Items Addressed This Visit       Type 2 diabetes mellitus without complication, without long-term current use of insulin (Multi) - Primary    Relevant Orders    POCT glycosylated hemoglobin (Hb A1C) manually resulted    POCT Albumin random urine manually " resulted    Lipid panel    Albumin-Creatinine Ratio, Urine Random    Hemoglobin A1C    Vitamin B12    Vitamin D 1,25 Dihydroxy (for eval of hypercalcemia)    Vitamin B 12 deficiency    Vitamin D deficiency    Mixed hyperlipidemia    Relevant Orders    POCT glycosylated hemoglobin (Hb A1C) manually resulted    POCT Albumin random urine manually resulted    Lipid panel     Other Visit Diagnoses       Encounter for immunization        Relevant Orders    Zoster vaccine, recombinant, adult (SHINGRIX)    Myalgia due to statin        Neoplasm of left breast, primary tumor staging category Tis: ductal carcinoma in situ (DCIS)            Left breast  DCIS (1.2 cm, intermediate grade, ER+) s/p left lumpectomy/SLNB on 8/23/23 with widely negative margins.  Finished up with radiation.  Mammogram negative 9/2024    Diabetes mellitus type 2  - Hemoglobin A1c was controlled at 5.7 3/18/25  - Not on a statin and LDL is uncontrolled.  Could not tolerate statins so we started Zetia  - Microalbumin has been negative  - Follow-up with eye doctor regularly  - Denies any cut scrapes or sores on the feet  - Currently on Trulicity 3 mg weekly and Jardiance 10 mg daily    Hyperlipidemia  - Zetia  - Statin caused severe muscle pain.     Follow-up in 6 months or as needed

## 2025-03-21 ENCOUNTER — PHARMACY VISIT (OUTPATIENT)
Dept: PHARMACY | Facility: CLINIC | Age: 55
End: 2025-03-21
Payer: COMMERCIAL

## 2025-03-21 PROCEDURE — RXMED WILLOW AMBULATORY MEDICATION CHARGE

## 2025-03-24 DIAGNOSIS — E11.9 TYPE 2 DIABETES MELLITUS WITHOUT COMPLICATION, WITHOUT LONG-TERM CURRENT USE OF INSULIN: ICD-10-CM

## 2025-03-25 ENCOUNTER — PHARMACY VISIT (OUTPATIENT)
Dept: PHARMACY | Facility: CLINIC | Age: 55
End: 2025-03-25

## 2025-03-25 DIAGNOSIS — E11.9 TYPE 2 DIABETES MELLITUS WITHOUT COMPLICATION, WITHOUT LONG-TERM CURRENT USE OF INSULIN: ICD-10-CM

## 2025-03-26 ENCOUNTER — PHARMACY VISIT (OUTPATIENT)
Dept: PHARMACY | Facility: CLINIC | Age: 55
End: 2025-03-26
Payer: COMMERCIAL

## 2025-03-26 PROCEDURE — RXOTC WILLOW AMBULATORY OTC CHARGE

## 2025-03-26 PROCEDURE — RXMED WILLOW AMBULATORY MEDICATION CHARGE

## 2025-04-10 ENCOUNTER — PHARMACY VISIT (OUTPATIENT)
Dept: PHARMACY | Facility: CLINIC | Age: 55
End: 2025-04-10
Payer: COMMERCIAL

## 2025-04-10 PROCEDURE — RXOTC WILLOW AMBULATORY OTC CHARGE

## 2025-04-22 ENCOUNTER — PHARMACY VISIT (OUTPATIENT)
Dept: PHARMACY | Facility: CLINIC | Age: 55
End: 2025-04-22
Payer: COMMERCIAL

## 2025-04-22 ENCOUNTER — LAB (OUTPATIENT)
Dept: LAB | Facility: HOSPITAL | Age: 55
End: 2025-04-22
Payer: COMMERCIAL

## 2025-04-22 DIAGNOSIS — E11.9 TYPE 2 DIABETES MELLITUS WITHOUT COMPLICATION, WITHOUT LONG-TERM CURRENT USE OF INSULIN: ICD-10-CM

## 2025-04-22 DIAGNOSIS — D05.12 DUCTAL CARCINOMA IN SITU (DCIS) OF LEFT BREAST: ICD-10-CM

## 2025-04-22 LAB
ALBUMIN SERPL BCP-MCNC: 4.2 G/DL (ref 3.4–5)
ALP SERPL-CCNC: 45 U/L (ref 33–110)
ALT SERPL W P-5'-P-CCNC: 12 U/L (ref 7–45)
ANION GAP SERPL CALC-SCNC: 12 MMOL/L (ref 10–20)
AST SERPL W P-5'-P-CCNC: 14 U/L (ref 9–39)
BASOPHILS # BLD AUTO: 0.05 X10*3/UL (ref 0–0.1)
BASOPHILS NFR BLD AUTO: 0.9 %
BILIRUB SERPL-MCNC: 0.3 MG/DL (ref 0–1.2)
BUN SERPL-MCNC: 11 MG/DL (ref 6–23)
CALCIUM SERPL-MCNC: 9 MG/DL (ref 8.6–10.3)
CHLORIDE SERPL-SCNC: 104 MMOL/L (ref 98–107)
CO2 SERPL-SCNC: 26 MMOL/L (ref 21–32)
CREAT SERPL-MCNC: 0.58 MG/DL (ref 0.5–1.05)
EGFRCR SERPLBLD CKD-EPI 2021: >90 ML/MIN/1.73M*2
EOSINOPHIL # BLD AUTO: 0.16 X10*3/UL (ref 0–0.7)
EOSINOPHIL NFR BLD AUTO: 2.7 %
ERYTHROCYTE [DISTWIDTH] IN BLOOD BY AUTOMATED COUNT: 14.3 % (ref 11.5–14.5)
GLUCOSE SERPL-MCNC: 96 MG/DL (ref 74–99)
HCT VFR BLD AUTO: 39.8 % (ref 36–46)
HGB BLD-MCNC: 12.7 G/DL (ref 12–16)
IMM GRANULOCYTES # BLD AUTO: 0.02 X10*3/UL (ref 0–0.7)
IMM GRANULOCYTES NFR BLD AUTO: 0.3 % (ref 0–0.9)
LYMPHOCYTES # BLD AUTO: 2.28 X10*3/UL (ref 1.2–4.8)
LYMPHOCYTES NFR BLD AUTO: 39 %
MCH RBC QN AUTO: 27.1 PG (ref 26–34)
MCHC RBC AUTO-ENTMCNC: 31.9 G/DL (ref 32–36)
MCV RBC AUTO: 85 FL (ref 80–100)
MONOCYTES # BLD AUTO: 0.38 X10*3/UL (ref 0.1–1)
MONOCYTES NFR BLD AUTO: 6.5 %
NEUTROPHILS # BLD AUTO: 2.95 X10*3/UL (ref 1.2–7.7)
NEUTROPHILS NFR BLD AUTO: 50.6 %
NRBC BLD-RTO: 0 /100 WBCS (ref 0–0)
PLATELET # BLD AUTO: 243 X10*3/UL (ref 150–450)
POTASSIUM SERPL-SCNC: 4.1 MMOL/L (ref 3.5–5.3)
PROT SERPL-MCNC: 6.8 G/DL (ref 6.4–8.2)
RBC # BLD AUTO: 4.69 X10*6/UL (ref 4–5.2)
SODIUM SERPL-SCNC: 138 MMOL/L (ref 136–145)
WBC # BLD AUTO: 5.8 X10*3/UL (ref 4.4–11.3)

## 2025-04-22 PROCEDURE — 85025 COMPLETE CBC W/AUTO DIFF WBC: CPT

## 2025-04-22 PROCEDURE — 80053 COMPREHEN METABOLIC PANEL: CPT

## 2025-04-22 PROCEDURE — RXMED WILLOW AMBULATORY MEDICATION CHARGE

## 2025-04-22 PROCEDURE — 36415 COLL VENOUS BLD VENIPUNCTURE: CPT

## 2025-04-22 RX ORDER — ISOPROPYL ALCOHOL 70 ML/100ML
SWAB TOPICAL
Qty: 100 EACH | Refills: 3 | Status: CANCELLED | OUTPATIENT
Start: 2025-04-22

## 2025-04-25 DIAGNOSIS — E11.9 TYPE 2 DIABETES MELLITUS WITHOUT COMPLICATION, WITHOUT LONG-TERM CURRENT USE OF INSULIN: ICD-10-CM

## 2025-04-25 RX ORDER — ISOPROPYL ALCOHOL 70 ML/100ML
SWAB TOPICAL
Qty: 100 EACH | Refills: 3 | Status: CANCELLED | OUTPATIENT
Start: 2025-04-22

## 2025-04-29 ENCOUNTER — APPOINTMENT (OUTPATIENT)
Dept: HEMATOLOGY/ONCOLOGY | Facility: CLINIC | Age: 55
End: 2025-04-29
Payer: COMMERCIAL

## 2025-04-29 VITALS
RESPIRATION RATE: 16 BRPM | OXYGEN SATURATION: 99 % | HEIGHT: 67 IN | TEMPERATURE: 95.5 F | BODY MASS INDEX: 34.14 KG/M2 | DIASTOLIC BLOOD PRESSURE: 81 MMHG | SYSTOLIC BLOOD PRESSURE: 127 MMHG | HEART RATE: 100 BPM

## 2025-04-29 DIAGNOSIS — D05.12 DUCTAL CARCINOMA IN SITU (DCIS) OF LEFT BREAST: ICD-10-CM

## 2025-04-29 PROCEDURE — 99213 OFFICE O/P EST LOW 20 MIN: CPT | Performed by: INTERNAL MEDICINE

## 2025-04-29 PROCEDURE — 3074F SYST BP LT 130 MM HG: CPT | Performed by: INTERNAL MEDICINE

## 2025-04-29 PROCEDURE — 3044F HG A1C LEVEL LT 7.0%: CPT | Performed by: INTERNAL MEDICINE

## 2025-04-29 PROCEDURE — 3079F DIAST BP 80-89 MM HG: CPT | Performed by: INTERNAL MEDICINE

## 2025-04-29 ASSESSMENT — PAIN SCALES - GENERAL: PAINLEVEL_OUTOF10: 0-NO PAIN

## 2025-04-29 NOTE — PATIENT INSTRUCTIONS
Follow up visit with Dr. Hodge for history of left breast cancer diagnosed in 2023.     Continue tamoxifen.     Follow up with Dr. Hodge in 6 months.     Call the office at [phone] with questions or needs.  You may also contact your nurse or doctor with non-urgent issues by sending a [employer] message.  Reminders  Medicine refills: call or send a [employer] message to request medicine refills at least 5 to 7 days before you run out.  Labs: You will need labs drawn about a week before your next visit

## 2025-04-29 NOTE — PROGRESS NOTES
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 daily started on November 28, 2023 9/16/24 , mammogram negative    Interval History:      4/29/25  Patient has a history of DCIS as of left breast status post resection and radiotherapy.  Post radiotherapy patient doing very well no any other complaint.        HPI  Referred by Dr. Howard      Screening mammogram on 9/23/22 showed developing calcifications and architectural alteration in the left breast UOQ (BI-RADS 0). Diagnostic  mammogram and ultrasound on 10/18/22 was read as calcifications with benign morphology (BI-RADS 3). Repeat diagnostic mammogram on 4/11/23 showed subtle increase in the number of calcifications that was suspicious (BI-RADS 4).     Biopsy of the calcifications on 4/19/23 showed intermediate grade DCIS, solid and cribriform pattern with calcifications, ER>95%.     Breast MRI on 6/2/23 showed non-mass enhancement in the right breast and minimal left breast biopsy changes with no additional sites of disease seen. There was no axillary or internal mammary adenopathy. MRI guided biopsy of the right breast on 7/19/23  showed perilobular hemangioma.     On 8/23/23, Dr. Howard took her to the OR for left lumpectomy/SLNB. Pathology showed DCIS measuring up to 1.2 cm, grade 2, with negative margins (>2 mm). One sentinel node was negative.   Medical oncology consultation was requested because of preventive therapy.  Patient is already deceiving go radiotherapy which will finish on normal testing 2023.       ROS  No headache and dizziness, no hot flash, no fever, no chest pain and shortness of breath, no nausea vomiting, no abdominal pain, no " diarrhea and constipation.  Status post hysterectomy, patient still have ovaries.    Patient is active working full-time    Past Medical History:   Diagnosis Date    Breast cancer (Multi)     GERD (gastroesophageal reflux disease)     Personal history of irradiation     Personal history of other medical treatment     History of screening mammography      Past Surgical History:   Procedure Laterality Date    BI MAMMO GUIDED BREAST LEFT LOCALIZATION Left 8/21/2023    BI MAMMO GUIDED LOCALIZATION BREAST LEFT 8/21/2023 [facility]    OTHER SURGICAL HISTORY  07/11/2019    Cholecystectomy    OTHER SURGICAL HISTORY  07/11/2019    Hysterectomy    OTHER SURGICAL HISTORY  07/11/2019    Tonsillectomy        Family History   Problem Relation Name Age of Onset    Diabetes Mother      Hyperlipidemia Father      Cancer Father  52        rectroperitenal liposarcoma      Social History     Tobacco Use   Smoking Status Never   Smokeless Tobacco Never      Current Outpatient Medications:     alcohol swabs pads, medicated, Use to test blood sugars 2-3 times a week as directed, Disp: 100 each, Rfl: 3    aspirin 81 mg EC tablet, Take 1 tablet (81 mg) by mouth once daily., Disp: , Rfl:     Autolet (Accu-Chek FastClix Lancing Dev) lancing device, Use with lancets to test blood sugars 2-3 times a week as directed, Disp: 1 each, Rfl: 0    blood sugar diagnostic (Accu-Chek Guide test strips) strip, Use to test blood sugars 2-3 times a week as directed, Disp: 100 each, Rfl: 3    blood-glucose meter (Accu-Chek Guide Glucose Meter) misc, Use to test blood sugars 2-3 times a week as directed, Disp: 1 each, Rfl: 0    cholecalciferol (Vitamin D-3) 25 MCG (1000 UT) capsule, Take 1 capsule (25 mcg) by mouth once daily., Disp: , Rfl:     dextromethorphan-guaifenesin (Mucinex DM)  mg 12 hr tablet, Take 1 tablet by mouth every 12 hours if needed for cough., Disp: , Rfl:     dulaglutide (Trulicity) 4.5 mg/0.5 mL pen injector, Inject 4.5 mg under the  skin 1 (one) time per week., Disp: 6 mL, Rfl: 3    empagliflozin (Jardiance) 10 mg, Take 1 tablet (10 mg) by mouth once daily., Disp: 90 tablet, Rfl: 0    ezetimibe (Zetia) 10 mg tablet, Take 1 tablet (10 mg) by mouth once daily., Disp: 30 tablet, Rfl: 5    fluticasone (Flonase) 50 mcg/actuation nasal spray, Administer 1 spray into each nostril once daily as needed for allergies., Disp: , Rfl:     lancets (Accu-Chek Fastclix Lancet Drum) misc, Use with lancing device to test blood sugars 2-3 times a week as directed, Disp: 102 each, Rfl: 3    omeprazole (PriLOSEC) 20 mg DR capsule, Take 1 capsule (20 mg) by mouth once daily., Disp: , Rfl:     tamoxifen (Nolvadex) 20 mg tablet, Take 1 tablet (20 mg total) by mouth once daily.  Take with water or any other nonalcoholic drink with or without food at around the same time(s) every day., Disp: 90 tablet, Rfl: 3    triamcinolone (Kenalog) 0.1 % cream, Apply to affected area topically 2 to 3 times a day until rash clears, Disp: 80 g, Rfl: 0      Physical Exam  Constitutional:       General: She is not in acute distress.     Appearance: Normal appearance. She is not toxic-appearing.   HENT:      Head: Normocephalic and atraumatic.      Mouth/Throat:      Mouth: Mucous membranes are moist.         Eyes:      General: No scleral icterus.     Extraocular Movements: Extraocular movements intact.      Conjunctiva/sclera: Conjunctivae normal.      Pupils: Pupils are equal, round, and reactive to light.   Pulmonary:      Effort: Pulmonary effort is normal. No respiratory distress.   Chest: Clear good air movement on the both side  Breasts:     Right: Normal.      Left: No swelling, bleeding, inverted nipple, mass, nipple discharge, skin change or tenderness.      Comments: Left UOQ lumpectomy and SLNB incisions well-healed and intact. No suspicious breast masses, skin changes, or nipple discharge.  Musculoskeletal:         General: Normal range of motion.         Lymphadenopathy:  No peripheral lymphadenopathy         Skin is warm and dry.      Skin is not jaundiced.      Findings: No lesion or rash.   Neurological:      General: No focal deficit present.      Nonfocal     psychiatric:         Mood and Affect: Mood normal.      Last Recorded Vitals        4/22/25) 7 mo ago  (9/10/24) 1 yr ago  (3/25/24) 1 yr ago  (11/16/23) 1 yr ago  (8/11/23) 2 yr ago  (1/21/23) 3 yr ago  (4/1/22)    WBC  4.4 - 11.3 x10*3/uL 5.8 7.9 5.8 7.0 8.2 R 7.6 R 9.4 R   nRBC  0.0 - 0.0 /100 WBCs 0.0 0.0  0.0      RBC  4.00 - 5.20 x10*6/uL 4.69 4.74 4.64 5.10 4.62 R 5.30 High  R 5.00 R   Hemoglobin  12.0 - 16.0 g/dL 12.7 13.1 12.8 14.0 12.7 14.6 13.4   Hematocrit  36.0 - 46.0 % 39.8 39.9 39.4 43.1 38.6 44.3 41.7   MCV  80 - 100 fL 85 84 85 85 84 84 83   MCH  26.0 - 34.0 pg 27.1 27.6 27.6 27.5      MCHC  32.0 - 36.0 g/dL 31.9 Low  32.8 32.5 32.5 32.9 33.0 32.1   RDW  11.5 - 14.5 % 14.3 15.1 High  14.8 High  13.9 14.1 13.6 14.3   Platelets  150 - 450 x10*3/uL 243           Relevant Results           Assessment/Plan   1.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   status post radiotherapy.   Tamoxifen 20 mg p.o. daily started on November 28, 2023    Continue tamoxifen for 5 years    Mammogram on regular basis    Discussed with patient    Zoran Hodge MD

## 2025-05-02 ENCOUNTER — PHARMACY VISIT (OUTPATIENT)
Dept: PHARMACY | Facility: CLINIC | Age: 55
End: 2025-05-02
Payer: COMMERCIAL

## 2025-05-02 PROCEDURE — RXOTC WILLOW AMBULATORY OTC CHARGE

## 2025-05-05 DIAGNOSIS — L71.0 PERIORAL DERMATITIS: Primary | ICD-10-CM

## 2025-05-05 PROCEDURE — RXMED WILLOW AMBULATORY MEDICATION CHARGE

## 2025-05-05 RX ORDER — TRIAMCINOLONE ACETONIDE 1 MG/G
CREAM TOPICAL
Qty: 15 G | Refills: 0 | Status: SHIPPED | OUTPATIENT
Start: 2025-05-05

## 2025-05-06 ENCOUNTER — PHARMACY VISIT (OUTPATIENT)
Dept: PHARMACY | Facility: CLINIC | Age: 55
End: 2025-05-06
Payer: COMMERCIAL

## 2025-05-30 ENCOUNTER — PHARMACY VISIT (OUTPATIENT)
Dept: PHARMACY | Facility: CLINIC | Age: 55
End: 2025-05-30
Payer: COMMERCIAL

## 2025-05-30 PROCEDURE — RXOTC WILLOW AMBULATORY OTC CHARGE

## 2025-06-04 DIAGNOSIS — E11.9 TYPE 2 DIABETES MELLITUS WITHOUT COMPLICATION, WITHOUT LONG-TERM CURRENT USE OF INSULIN: ICD-10-CM

## 2025-06-04 DIAGNOSIS — E78.2 MIXED HYPERLIPIDEMIA: ICD-10-CM

## 2025-06-04 PROCEDURE — RXMED WILLOW AMBULATORY MEDICATION CHARGE

## 2025-06-04 RX ORDER — LANCETS 26 GAUGE
EACH MISCELLANEOUS
Qty: 1 EACH | Refills: 0 | Status: CANCELLED | OUTPATIENT
Start: 2025-06-04 | End: 2026-06-04

## 2025-06-04 RX ORDER — EZETIMIBE 10 MG/1
10 TABLET ORAL DAILY
Qty: 30 TABLET | Refills: 11 | Status: SHIPPED | OUTPATIENT
Start: 2025-06-04 | End: 2025-12-01

## 2025-06-09 ENCOUNTER — PHARMACY VISIT (OUTPATIENT)
Dept: PHARMACY | Facility: CLINIC | Age: 55
End: 2025-06-09
Payer: COMMERCIAL

## 2025-06-11 DIAGNOSIS — E11.9 TYPE 2 DIABETES MELLITUS WITHOUT COMPLICATION, WITHOUT LONG-TERM CURRENT USE OF INSULIN: ICD-10-CM

## 2025-06-11 RX ORDER — LANCETS 26 GAUGE
EACH MISCELLANEOUS
Qty: 1 EACH | Refills: 0 | Status: CANCELLED | OUTPATIENT
Start: 2025-06-04 | End: 2026-06-04

## 2025-06-16 DIAGNOSIS — E11.9 TYPE 2 DIABETES MELLITUS WITHOUT COMPLICATION, WITHOUT LONG-TERM CURRENT USE OF INSULIN: ICD-10-CM

## 2025-06-16 PROCEDURE — RXMED WILLOW AMBULATORY MEDICATION CHARGE

## 2025-06-16 RX ORDER — ISOPROPYL ALCOHOL 70 ML/100ML
SWAB TOPICAL
Qty: 100 EACH | Refills: 3 | Status: CANCELLED | OUTPATIENT
Start: 2025-06-16

## 2025-06-16 RX ORDER — BLOOD SUGAR DIAGNOSTIC
STRIP MISCELLANEOUS
Qty: 100 EACH | Refills: 3 | Status: CANCELLED | OUTPATIENT
Start: 2025-06-16

## 2025-06-18 PROCEDURE — RXMED WILLOW AMBULATORY MEDICATION CHARGE

## 2025-06-18 RX ORDER — ISOPROPYL ALCOHOL 70 ML/100ML
SWAB TOPICAL
Qty: 100 EACH | Refills: 3 | Status: SHIPPED | OUTPATIENT
Start: 2025-06-18

## 2025-06-18 RX ORDER — BLOOD SUGAR DIAGNOSTIC
STRIP MISCELLANEOUS
Qty: 100 EACH | Refills: 3 | Status: SHIPPED | OUTPATIENT
Start: 2025-06-18

## 2025-06-23 ENCOUNTER — PHARMACY VISIT (OUTPATIENT)
Dept: PHARMACY | Facility: CLINIC | Age: 55
End: 2025-06-23
Payer: COMMERCIAL

## 2025-06-23 PROCEDURE — RXOTC WILLOW AMBULATORY OTC CHARGE

## 2025-07-08 PROCEDURE — RXMED WILLOW AMBULATORY MEDICATION CHARGE

## 2025-07-09 ENCOUNTER — PHARMACY VISIT (OUTPATIENT)
Dept: PHARMACY | Facility: CLINIC | Age: 55
End: 2025-07-09
Payer: COMMERCIAL

## 2025-07-31 ENCOUNTER — PHARMACY VISIT (OUTPATIENT)
Dept: PHARMACY | Facility: CLINIC | Age: 55
End: 2025-07-31
Payer: COMMERCIAL

## 2025-07-31 PROCEDURE — RXOTC WILLOW AMBULATORY OTC CHARGE

## 2025-08-07 DIAGNOSIS — E78.2 MIXED HYPERLIPIDEMIA: Primary | ICD-10-CM

## 2025-08-19 DIAGNOSIS — N30.00 ACUTE CYSTITIS WITHOUT HEMATURIA: Primary | ICD-10-CM

## 2025-08-19 PROCEDURE — RXMED WILLOW AMBULATORY MEDICATION CHARGE

## 2025-08-19 RX ORDER — SULFAMETHOXAZOLE AND TRIMETHOPRIM 800; 160 MG/1; MG/1
1 TABLET ORAL 2 TIMES DAILY
Qty: 6 TABLET | Refills: 0 | Status: SHIPPED | OUTPATIENT
Start: 2025-08-19 | End: 2025-08-23

## 2025-08-20 ENCOUNTER — PHARMACY VISIT (OUTPATIENT)
Dept: PHARMACY | Facility: CLINIC | Age: 55
End: 2025-08-20
Payer: COMMERCIAL

## 2025-08-20 DIAGNOSIS — B37.31 VAGINAL CANDIDIASIS: Primary | ICD-10-CM

## 2025-08-20 PROCEDURE — RXMED WILLOW AMBULATORY MEDICATION CHARGE

## 2025-08-20 RX ORDER — FLUCONAZOLE 150 MG/1
150 TABLET ORAL ONCE
Qty: 1 TABLET | Refills: 0 | Status: SHIPPED | OUTPATIENT
Start: 2025-08-20 | End: 2025-08-21

## 2025-09-02 ENCOUNTER — PHARMACY VISIT (OUTPATIENT)
Dept: PHARMACY | Facility: CLINIC | Age: 55
End: 2025-09-02
Payer: COMMERCIAL

## 2025-09-02 DIAGNOSIS — E11.9 TYPE 2 DIABETES MELLITUS WITHOUT COMPLICATION, WITHOUT LONG-TERM CURRENT USE OF INSULIN: ICD-10-CM

## 2025-09-02 PROCEDURE — RXOTC WILLOW AMBULATORY OTC CHARGE

## 2025-09-02 RX ORDER — DULAGLUTIDE 4.5 MG/.5ML
4.5 INJECTION, SOLUTION SUBCUTANEOUS WEEKLY
Qty: 6 ML | Refills: 3 | Status: CANCELLED | OUTPATIENT
Start: 2025-09-02 | End: 2026-09-02

## 2025-09-03 ENCOUNTER — PHARMACY VISIT (OUTPATIENT)
Dept: PHARMACY | Facility: CLINIC | Age: 55
End: 2025-09-03
Payer: COMMERCIAL

## 2025-09-03 PROCEDURE — RXMED WILLOW AMBULATORY MEDICATION CHARGE

## 2025-09-03 RX ORDER — DULAGLUTIDE 4.5 MG/.5ML
4.5 INJECTION, SOLUTION SUBCUTANEOUS WEEKLY
Qty: 6 ML | Refills: 3 | Status: SHIPPED | OUTPATIENT
Start: 2025-09-03 | End: 2026-09-03

## 2025-09-07 LAB
CHOLEST SERPL-MCNC: 153 MG/DL
CHOLEST/HDLC SERPL: 3.5 (CALC)
HDLC SERPL-MCNC: 44 MG/DL
LDLC SERPL CALC-MCNC: 92 MG/DL (CALC)
NONHDLC SERPL-MCNC: 109 MG/DL (CALC)
TRIGL SERPL-MCNC: 82 MG/DL

## 2025-09-19 ENCOUNTER — APPOINTMENT (OUTPATIENT)
Dept: RADIOLOGY | Facility: HOSPITAL | Age: 55
End: 2025-09-19
Payer: COMMERCIAL

## 2025-09-24 ENCOUNTER — APPOINTMENT (OUTPATIENT)
Dept: PRIMARY CARE | Facility: CLINIC | Age: 55
End: 2025-09-24
Payer: COMMERCIAL